# Patient Record
Sex: MALE | Employment: UNEMPLOYED | URBAN - METROPOLITAN AREA
[De-identification: names, ages, dates, MRNs, and addresses within clinical notes are randomized per-mention and may not be internally consistent; named-entity substitution may affect disease eponyms.]

---

## 2020-08-09 ENCOUNTER — HOSPITAL ENCOUNTER (EMERGENCY)
Age: 40
Discharge: HOME | End: 2020-08-10
Payer: COMMERCIAL

## 2020-08-09 ENCOUNTER — APPOINTMENT (OUTPATIENT)
Dept: CT IMAGING | Age: 40
End: 2020-08-09
Payer: COMMERCIAL

## 2020-08-09 ENCOUNTER — APPOINTMENT (OUTPATIENT)
Dept: GENERAL RADIOLOGY | Age: 40
End: 2020-08-09
Payer: COMMERCIAL

## 2020-08-09 VITALS
TEMPERATURE: 97.5 F | SYSTOLIC BLOOD PRESSURE: 122 MMHG | HEART RATE: 97 BPM | RESPIRATION RATE: 15 BRPM | DIASTOLIC BLOOD PRESSURE: 77 MMHG | OXYGEN SATURATION: 96 %

## 2020-08-09 LAB
A/G RATIO: 1.2 (ref 1.1–2.2)
ALBUMIN SERPL-MCNC: 4.3 G/DL (ref 3.4–5)
ALP BLD-CCNC: 81 U/L (ref 40–129)
ALT SERPL-CCNC: 33 U/L (ref 10–40)
ANION GAP SERPL CALCULATED.3IONS-SCNC: 12 MMOL/L (ref 3–16)
AST SERPL-CCNC: 45 U/L (ref 15–37)
BASOPHILS ABSOLUTE: 0 K/UL (ref 0–0.2)
BASOPHILS RELATIVE PERCENT: 0.5 %
BILIRUB SERPL-MCNC: 0.6 MG/DL (ref 0–1)
BILIRUBIN URINE: NEGATIVE
BLOOD, URINE: NEGATIVE
BUN BLDV-MCNC: 14 MG/DL (ref 7–20)
CALCIUM SERPL-MCNC: 8.7 MG/DL (ref 8.3–10.6)
CHLORIDE BLD-SCNC: 101 MMOL/L (ref 99–110)
CLARITY: CLEAR
CO2: 22 MMOL/L (ref 21–32)
COLOR: YELLOW
CREAT SERPL-MCNC: 1 MG/DL (ref 0.9–1.3)
D DIMER: <200 NG/ML DDU (ref 0–229)
EOSINOPHILS ABSOLUTE: 0 K/UL (ref 0–0.6)
EOSINOPHILS RELATIVE PERCENT: 0.1 %
GFR AFRICAN AMERICAN: >60
GFR NON-AFRICAN AMERICAN: >60
GLOBULIN: 3.5 G/DL
GLUCOSE BLD-MCNC: 101 MG/DL (ref 70–99)
GLUCOSE URINE: NEGATIVE MG/DL
HCT VFR BLD CALC: 47.1 % (ref 40.5–52.5)
HEMOGLOBIN: 15.9 G/DL (ref 13.5–17.5)
INR BLD: 1.14 (ref 0.86–1.14)
KETONES, URINE: 40 MG/DL
LEUKOCYTE ESTERASE, URINE: NEGATIVE
LYMPHOCYTES ABSOLUTE: 1.5 K/UL (ref 1–5.1)
LYMPHOCYTES RELATIVE PERCENT: 27.5 %
MCH RBC QN AUTO: 28.2 PG (ref 26–34)
MCHC RBC AUTO-ENTMCNC: 33.9 G/DL (ref 31–36)
MCV RBC AUTO: 83.4 FL (ref 80–100)
MICROSCOPIC EXAMINATION: ABNORMAL
MONOCYTES ABSOLUTE: 0.5 K/UL (ref 0–1.3)
MONOCYTES RELATIVE PERCENT: 9.1 %
NEUTROPHILS ABSOLUTE: 3.4 K/UL (ref 1.7–7.7)
NEUTROPHILS RELATIVE PERCENT: 62.8 %
NITRITE, URINE: NEGATIVE
PDW BLD-RTO: 13.6 % (ref 12.4–15.4)
PH UA: 5 (ref 5–8)
PLATELET # BLD: 127 K/UL (ref 135–450)
PMV BLD AUTO: 9.6 FL (ref 5–10.5)
POTASSIUM REFLEX MAGNESIUM: 4.4 MMOL/L (ref 3.5–5.1)
PRO-BNP: 12 PG/ML (ref 0–124)
PROTEIN UA: NEGATIVE MG/DL
PROTHROMBIN TIME: 13.2 SEC (ref 10–13.2)
RBC # BLD: 5.65 M/UL (ref 4.2–5.9)
SODIUM BLD-SCNC: 135 MMOL/L (ref 136–145)
SPECIFIC GRAVITY UA: 1.02 (ref 1–1.03)
TOTAL PROTEIN: 7.8 G/DL (ref 6.4–8.2)
TROPONIN: <0.01 NG/ML
URINE REFLEX TO CULTURE: ABNORMAL
URINE TYPE: ABNORMAL
UROBILINOGEN, URINE: 0.2 E.U./DL
WBC # BLD: 5.5 K/UL (ref 4–11)

## 2020-08-09 PROCEDURE — 96374 THER/PROPH/DIAG INJ IV PUSH: CPT

## 2020-08-09 PROCEDURE — 85025 COMPLETE CBC W/AUTO DIFF WBC: CPT

## 2020-08-09 PROCEDURE — 2580000003 HC RX 258: Performed by: PHYSICIAN ASSISTANT

## 2020-08-09 PROCEDURE — 93005 ELECTROCARDIOGRAM TRACING: CPT | Performed by: EMERGENCY MEDICINE

## 2020-08-09 PROCEDURE — 70450 CT HEAD/BRAIN W/O DYE: CPT

## 2020-08-09 PROCEDURE — 84484 ASSAY OF TROPONIN QUANT: CPT

## 2020-08-09 PROCEDURE — 6360000002 HC RX W HCPCS: Performed by: PHYSICIAN ASSISTANT

## 2020-08-09 PROCEDURE — 83880 ASSAY OF NATRIURETIC PEPTIDE: CPT

## 2020-08-09 PROCEDURE — 85379 FIBRIN DEGRADATION QUANT: CPT

## 2020-08-09 PROCEDURE — 36415 COLL VENOUS BLD VENIPUNCTURE: CPT

## 2020-08-09 PROCEDURE — 81003 URINALYSIS AUTO W/O SCOPE: CPT

## 2020-08-09 PROCEDURE — 80053 COMPREHEN METABOLIC PANEL: CPT

## 2020-08-09 PROCEDURE — 85610 PROTHROMBIN TIME: CPT

## 2020-08-09 PROCEDURE — 71045 X-RAY EXAM CHEST 1 VIEW: CPT

## 2020-08-09 PROCEDURE — 99285 EMERGENCY DEPT VISIT HI MDM: CPT

## 2020-08-09 RX ORDER — KETOROLAC TROMETHAMINE 30 MG/ML
15 INJECTION, SOLUTION INTRAMUSCULAR; INTRAVENOUS ONCE
Status: COMPLETED | OUTPATIENT
Start: 2020-08-09 | End: 2020-08-09

## 2020-08-09 RX ORDER — METOCLOPRAMIDE HYDROCHLORIDE 5 MG/ML
10 INJECTION INTRAMUSCULAR; INTRAVENOUS ONCE
Status: DISCONTINUED | OUTPATIENT
Start: 2020-08-09 | End: 2020-08-09

## 2020-08-09 RX ORDER — DIPHENHYDRAMINE HYDROCHLORIDE 50 MG/ML
25 INJECTION INTRAMUSCULAR; INTRAVENOUS ONCE
Status: DISCONTINUED | OUTPATIENT
Start: 2020-08-09 | End: 2020-08-09

## 2020-08-09 RX ORDER — KETOROLAC TROMETHAMINE 30 MG/ML
15 INJECTION, SOLUTION INTRAMUSCULAR; INTRAVENOUS ONCE
Status: DISCONTINUED | OUTPATIENT
Start: 2020-08-09 | End: 2020-08-09

## 2020-08-09 RX ORDER — 0.9 % SODIUM CHLORIDE 0.9 %
1000 INTRAVENOUS SOLUTION INTRAVENOUS ONCE
Status: COMPLETED | OUTPATIENT
Start: 2020-08-09 | End: 2020-08-09

## 2020-08-09 RX ORDER — 0.9 % SODIUM CHLORIDE 0.9 %
1000 INTRAVENOUS SOLUTION INTRAVENOUS ONCE
Status: COMPLETED | OUTPATIENT
Start: 2020-08-09 | End: 2020-08-10

## 2020-08-09 RX ORDER — BUTALBITAL, ACETAMINOPHEN AND CAFFEINE 300; 40; 50 MG/1; MG/1; MG/1
1 CAPSULE ORAL EVERY 4 HOURS PRN
Qty: 21 CAPSULE | Refills: 0 | Status: SHIPPED | OUTPATIENT
Start: 2020-08-09 | End: 2020-08-14

## 2020-08-09 RX ADMIN — KETOROLAC TROMETHAMINE 15 MG: 30 INJECTION, SOLUTION INTRAMUSCULAR at 23:18

## 2020-08-09 RX ADMIN — SODIUM CHLORIDE 1000 ML: 9 INJECTION, SOLUTION INTRAVENOUS at 21:09

## 2020-08-09 RX ADMIN — SODIUM CHLORIDE 1000 ML: 9 INJECTION, SOLUTION INTRAVENOUS at 23:18

## 2020-08-09 ASSESSMENT — ENCOUNTER SYMPTOMS
EYE REDNESS: 0
NAUSEA: 0
COUGH: 1
BACK PAIN: 0
SHORTNESS OF BREATH: 1
ABDOMINAL PAIN: 0
CHEST TIGHTNESS: 0
VOMITING: 0
DIARRHEA: 0

## 2020-08-09 ASSESSMENT — HEART SCORE: ECG: 0

## 2020-08-09 ASSESSMENT — PAIN SCALES - GENERAL
PAINLEVEL_OUTOF10: 5
PAINLEVEL_OUTOF10: 5

## 2020-08-10 LAB
EKG ATRIAL RATE: 110 BPM
EKG DIAGNOSIS: NORMAL
EKG P AXIS: 40 DEGREES
EKG P-R INTERVAL: 132 MS
EKG Q-T INTERVAL: 310 MS
EKG QRS DURATION: 68 MS
EKG QTC CALCULATION (BAZETT): 419 MS
EKG R AXIS: 33 DEGREES
EKG T AXIS: 6 DEGREES
EKG VENTRICULAR RATE: 110 BPM

## 2020-08-10 PROCEDURE — 93010 ELECTROCARDIOGRAM REPORT: CPT | Performed by: INTERNAL MEDICINE

## 2020-08-10 PROCEDURE — U0003 INFECTIOUS AGENT DETECTION BY NUCLEIC ACID (DNA OR RNA); SEVERE ACUTE RESPIRATORY SYNDROME CORONAVIRUS 2 (SARS-COV-2) (CORONAVIRUS DISEASE [COVID-19]), AMPLIFIED PROBE TECHNIQUE, MAKING USE OF HIGH THROUGHPUT TECHNOLOGIES AS DESCRIBED BY CMS-2020-01-R: HCPCS

## 2020-08-10 NOTE — ED PROVIDER NOTES
[address] [facility]        Pt Name: Francisco Childs  MRN: [id number]  [place] 1980  Date of evaluation: 8/9/2020  Provider: Connie Onofer PA-C  PCP: No primary care provider on file. Evaluation by SUDHEER. My supervising physician was available for consultation. CHIEF COMPLAINT       Chief Complaint   Patient presents with    Chest Pain     chest pain and headache for three days. unsure if he has had fever. HISTORY OF PRESENT ILLNESS   (Location, Timing/Onset, Context/Setting, Quality, Duration, Modifying Factors, Severity, Associated Signs and Symptoms)  Note limiting factors. Francisco Childs is a 38 y.o. male presents the emergency department with difficulties as a pertains to possible febrile illness, chest pain, headache, malaise, fatigue, and body aches. Patient states that he has had a potential for COVID exposure and a niece who has COVID. He states he has had symptoms for approximately 3 days. He denies any underlying lung disease. He states he has been experiencing some pain and discomfort in his chest as well as generalized headache pain. He is taken medications in the home environment with only marginal levels of relief. He states the pain and discomfort is diffuse over the entire chest.  It is not substernal chest pain. It does not radiate. It is somewhat intermittent and is not following any kind of pattern. He states his cough is nonproductive. He goes on to report his headache pain is generalized over his entire head. He does not have a report that this is thunderclap in onset. Is not the worst headache of his life. It is responsive to medications in the home environment. He states he is not having any significant shortness of breath. He denies a history of DVT and or PE. Denies travel history. He denies abdominal or flank pain. No nausea vomiting or diarrhea.   Has no additional complaints voiced at the present time. Nursing Notes were all reviewed and agreed with or any disagreements were addressed in the HPI. REVIEW OF SYSTEMS    (2-9 systems for level 4, 10 or more for level 5)     Review of Systems   Constitutional: Positive for chills and fatigue. Negative for activity change and fever. Eyes: Negative for redness. Respiratory: Positive for cough and shortness of breath. Negative for chest tightness. Cardiovascular: Positive for chest pain. Gastrointestinal: Negative for abdominal pain, diarrhea, nausea and vomiting. Genitourinary: Negative for dysuria and flank pain. Musculoskeletal: Positive for myalgias. Negative for back pain. Skin: Negative for rash and wound. Neurological: Positive for headaches. Positives and Pertinent negatives as per HPI. Except as noted above in the ROS, all other systems were reviewed and negative. PAST MEDICAL HISTORY   History reviewed. No pertinent past medical history. SURGICAL HISTORY   History reviewed. No pertinent surgical history. CURRENTMEDICATIONS       Previous Medications    IBUPROFEN (ADVIL;MOTRIN) 800 MG TABLET    Take 1 tablet by mouth every 8 hours as needed for Pain or Fever         ALLERGIES     Patient has no known allergies. FAMILYHISTORY     History reviewed. No pertinent family history. SOCIAL HISTORY       Social History     Tobacco Use    Smoking status: Never Smoker    Smokeless tobacco: Never Used   Substance Use Topics    Alcohol use: No    Drug use: No       SCREENINGS      Heart Score for chest pain patients  History:  Moderately Suspicious  ECG: Normal  Patient Age: < 45 years  Risk Factors: No risk factors known  Troponin: < 1X normal limit  Heart Score Total: 1      PHYSICAL EXAM    (up to 7 for level 4, 8 or more for level 5)     ED Triage Vitals [08/09/20 2017]   BP Temp Temp Source Pulse Resp SpO2 Height Weight   132/82 97.5 °F (36.4 °C) Oral 116 15 95 % -- --       Physical Exam  Vitals signs and nursing note reviewed. Constitutional:       General: He is awake. He is not in acute distress. Appearance: Normal appearance. He is well-developed. He is not ill-appearing or diaphoretic. HENT:      Head: Normocephalic and atraumatic. No raccoon eyes, Stewart's sign or contusion. Right Ear: External ear normal.      Left Ear: External ear normal.   Eyes:      General: No scleral icterus. Right eye: No discharge. Left eye: No discharge. Conjunctiva/sclera: Conjunctivae normal.   Neck:      Musculoskeletal: Normal range of motion. Vascular: No JVD. Cardiovascular:      Rate and Rhythm: Normal rate and regular rhythm. Heart sounds: No murmur. No friction rub. No gallop. Comments: Bilateral calves supple. Negative Homans bilaterally. Pulmonary:      Effort: Pulmonary effort is normal. No accessory muscle usage or respiratory distress. Breath sounds: Normal breath sounds. No wheezing, rhonchi or rales. Chest:      Comments: No areas of tenderness to palpation over the chest wall. Abdominal:      General: There is no distension. Palpations: Abdomen is soft. Abdomen is not rigid. There is no mass. Tenderness: There is no abdominal tenderness. There is no guarding or rebound. Musculoskeletal:      Right lower leg: No edema. Left lower leg: No edema. Skin:     General: Skin is warm and dry. Neurological:      General: No focal deficit present. Mental Status: He is alert and oriented to person, place, and time. GCS: GCS eye subscore is 4. GCS verbal subscore is 5. GCS motor subscore is 6. Cranial Nerves: No cranial nerve deficit. Sensory: No sensory deficit. Coordination: Coordination normal.   Psychiatric:         Behavior: Behavior normal. Behavior is cooperative.          DIAGNOSTIC RESULTS   LABS:    Labs Reviewed   CBC WITH AUTO DIFFERENTIAL - Abnormal; Notable for the following components:       Result Value    Platelets 545 (*)     All other components within normal limits    Narrative:     Performed at:  [facility] [address], [facility], [address] [employer]   Phone [phone]   COMPREHENSIVE METABOLIC PANEL W/ REFLEX TO MG FOR LOW K - Abnormal; Notable for the following components:    Sodium 135 (*)     Glucose 101 (*)     AST 45 (*)     All other components within normal limits    Narrative:     Performed at:  [facility] [address], [facility], [facility] [employer]   Phone [phone]   URINE RT REFLEX TO CULTURE - Abnormal; Notable for the following components:    Ketones, Urine 40 (*)     All other components within normal limits    Narrative:     Performed at:  [facility] [address], [facility], [facility] [employer]   Phone [phone]   TROPONIN    Narrative:     Performed at:  [facility] [address], [facility], [facility] [employer]   Phone [phone]   PROTIME-INR    Narrative:     Performed at:  [facility] [address], [facility], [address] [employer]   Phone [phone] PEPTIDE    Narrative:     Performed at:  [facility] [address], [facility], [facility] [employer]   Phone [phone]   D-DIMER, QUANTITATIVE    Narrative:     Performed at:  [facility] [address], [facility], [facility] [employer]   Phone [phone]       All other labs were within normal range or not returned as of this dictation. EKG: All EKG's are interpreted by the Emergency Department Physician in the absence of a cardiologist.  Please see their note for interpretation of EKG.       RADIOLOGY:   Non-plain film images such as CT, Ultrasound and MRI are read by the radiologist. Plain radiographic images are visualized and preliminarily interpreted by the ED Provider with the below findings:        Interpretation per the Radiologist below, if available at the time of this note:    XR CHEST PORTABLE   Final Result   1. No radiographic finding to account for patient's chest pain. CT HEAD WO CONTRAST   Final Result   No acute intracranial abnormality. No results found. PROCEDURES   Unless otherwise noted below, none     Procedures    CRITICAL CARE TIME   N/A    CONSULTS:  None      EMERGENCY DEPARTMENT COURSE and DIFFERENTIAL DIAGNOSIS/MDM:   Vitals:    Vitals:    08/09/20 2017 08/09/20 2115 08/09/20 2258 08/09/20 2328   BP: 132/82  122/77    Pulse: 116 103 102 97   Resp: 15      Temp: 97.5 °F (36.4 °C)      TempSrc: Oral      SpO2: 95% 97% 94% 96%       Patient was given the following medications:  Medications   0.9 % sodium chloride bolus (1,000 mLs Intravenous New Bag 8/9/20 2318)   0.9 % sodium chloride bolus (0 mLs Intravenous Stopped 8/9/20 2221)   ketorolac (TORADOL) injection 15 mg (15 mg Intravenous Given 8/9/20 2318)           The patient's detailed history of present illness is documented as above. Upon arrival to the emergency department the patient's vital signs are as documented. The patient is noted to be hemodynamically stable and afebrile. Physical examination findings are as above. IV access was obtained. Laboratory testing and work-up was initiated. Initial EKG performed upon arrival demonstrates a sinus tachycardia with a rate of 110. Normal axis and interval.  There is an incidental Q3T3 pattern that is noted he also has non-qualifying Q waves in aVF. No old EKG available for comparison. Please see attending physician details for further EKG interpretation. The patient's CBC is as documented above demonstrating no evidence of significant leukocytosis, anemia, mild thrombocytopenia at 127. BUN is 14 creatinine is 1.0 nonfasting glucose 101.   Sodium 135 remainder of his electrolytes are normal.  Slight hemolysis with an AST of 45 without evidence of elevation his alkaline phosphatase and no evidence of bilirubinemia. Patient's troponin is less than 0.01. INR is 1.14 proBNP is 12 and d-dimer is negative predictive value of less than 200. Chest x-ray demonstrates no evidence of acute cardiopulmonary process. CT of the head demonstrates no evidence of acute intercranial abnormality. Initial orders for headache cocktail had to be canceled as the patient did drive himself here to the emergency department today. He did have improvement with his symptoms with Toradol as above. Patient's tachycardia resolved with the IV fluids here in the emergency department setting. I do have concerns that the patient symptomatology could be consistent with COVID infection. He was tested for COVID here in the emergency department. Ongoing care management on an outpatient basis with isolation protocol until the test results are made available. Strict potential instructions for return more importantly were discussed. Analysis demonstrates evidence of dehydration but there is no evidence of infection. This as well as the patient's reassuring heart score is such that I believe that he can be maintained on an outpatient basis and does not require delta troponin since he has been having symptoms for more than 3 days. The patient has been made aware of the signs and symptoms which would necessitate an immediate return to the emergency department and verbalizes an understanding of these signs and symptoms. The patients clinical picture is most consistent with a non-cardiac etiology. Multiple potential etiologies were considered. No clinical evidence at this time to suggest acute coronary syndrome, pulmonary embolism, aortic dissection, aortic aneurysm, myocarditis, pneumonia, pneumothorax or pericardial tamponade or pericarditis. I feel the patient can be safely discharged to home with outpatient follow up.   I discussed this plan with the

## 2020-08-10 NOTE — ED PROVIDER NOTES
EKG Interpretation    Interpreted by emergency department physician    Rhythm: normal sinus   Rate: tachycardia  Axis: normal  Ectopy: none  Conduction: normal  ST Segments: no acute change  T Waves: no acute change  Q Waves: none    Clinical Impression: no acute changes    Winston Moise MD  08/09/20 6902

## 2020-08-11 LAB — SARS-COV-2, NAA: DETECTED

## 2020-08-25 ENCOUNTER — TELEPHONE (OUTPATIENT)
Dept: FAMILY MEDICINE CLINIC | Age: 40
End: 2020-08-25

## 2020-08-25 NOTE — TELEPHONE ENCOUNTER
The following patient needs an  scheduled or cancelled for an upcoming appointment.     Language:  Latvian     Appointment Date: 8/28/2020      Appointment Time: 11:00 am      Provider Scheduled with:  Sveta Figueroa    Thank you

## 2020-08-28 ENCOUNTER — VIRTUAL VISIT (OUTPATIENT)
Dept: FAMILY MEDICINE CLINIC | Age: 40
End: 2020-08-28
Payer: COMMERCIAL

## 2020-08-28 PROCEDURE — 99443 PR PHYS/QHP TELEPHONE EVALUATION 21-30 MIN: CPT | Performed by: CLINICAL NURSE SPECIALIST

## 2020-08-28 ASSESSMENT — PATIENT HEALTH QUESTIONNAIRE - PHQ9
1. LITTLE INTEREST OR PLEASURE IN DOING THINGS: 0
SUM OF ALL RESPONSES TO PHQ QUESTIONS 1-9: 0
SUM OF ALL RESPONSES TO PHQ9 QUESTIONS 1 & 2: 0
2. FEELING DOWN, DEPRESSED OR HOPELESS: 0
SUM OF ALL RESPONSES TO PHQ QUESTIONS 1-9: 0

## 2020-08-28 ASSESSMENT — ENCOUNTER SYMPTOMS
CONSTIPATION: 0
NAUSEA: 0
DIARRHEA: 0
WHEEZING: 0
ABDOMINAL PAIN: 0
VOMITING: 0
CHEST TIGHTNESS: 0

## 2020-08-28 NOTE — PROGRESS NOTES
Talking in complete sentences  Neurological:      Mental Status: He is alert and oriented to person, place, and time. Psychiatric:         Mood and Affect: Mood normal.         Thought Content: Thought content normal.       There were no vitals taken for this visit. BP Readings from Last 3 Encounters:   08/09/20 122/77   08/13/18 (!) 154/97      Wt Readings from Last 3 Encounters:   No data found for Wt       ASSESSMENT & PLAN:    1. COVID-19  - COVID-19 Ambulatory; Future    2. Encounter to establish care  - Patient's allergies, medication, medical, surgical, family and social history were reviewed and updated accordingly. - Encouraged to schedule complete physical with fasting labs      Continue current treatment plan. Current Outpatient Medications   Medication Sig Dispense Refill    butalbital-APAP-caffeine (FIORICET) -40 MG CAPS per capsule Take 1 capsule by mouth every 4 hours as needed for Headaches or Migraine 21 capsule 0    aspirin 325 MG EC tablet Take 1 tablet by mouth daily (Patient not taking: Reported on 8/28/2020) 30 tablet 0    ibuprofen (ADVIL;MOTRIN) 800 MG tablet Take 1 tablet by mouth every 8 hours as needed for Pain or Fever (Patient not taking: Reported on 8/28/2020) 30 tablet 0     No current facility-administered medications for this visit. Return if symptoms worsen or fail to improve, for positive covid test, establish care. Shaista received counseling on the following healthy behaviors: nutrition, exercise and medication adherence    Patient given educational materials on Covid 19    Discussed use, benefit, and side effects of prescribed medications. Barriers to medication compliance addressed. All patient questions answered. Pt voiced understanding. Call office if experience side effects from medications. Please note that some or all of this record was generated using voice recognition software.  If there are any questions about the content of this document, please contact the author as some errors in transcription may have occurred. Chantale Hurd is a 38 y.o. male being evaluated by a Virtual Visit (video visit) encounter to address concerns as mentioned above. A caregiver was present when appropriate. Due to this being a TeleHealth encounter (During VFORV-58 public health emergency), evaluation of the following organ systems was limited: Vitals/Constitutional/EENT/Resp/CV/GI//MS/Neuro/Skin/Heme-Lymph-Imm. Pursuant to the emergency declaration under the [address] [place] [address], [address] [place] [address] authority and the [employer] Act, this Virtual Visit was conducted with patient's (and/or legal guardian's) consent, to reduce the patient's risk of exposure to COVID-19 and provide necessary medical care. The patient (and/or legal guardian) has also been advised to contact this office for worsening conditions or problems, and seek emergency medical treatment and/or call 911 if deemed necessary. Patient identification was verified at the start of the visit: Yes    Total time spent for this encounter: 34 minutes 12 seconds    Services were provided through a video synchronous discussion virtually to substitute for in-person clinic visit. Patient and provider were located at their individual homes. --NATALY Abarca CNP on 8/28/2020 at 12:01 PM    An electronic signature was used to authenticate this note.

## 2020-08-28 NOTE — PATIENT INSTRUCTIONS
Discussed Covid 19, information given    Schedule for clinic visit for retest for Covid 19    Needs negative test to return to work. Encourage to schedule in person visit complete physical, with lab  Patient Education        ??????????? (COVID-19) ???? ?????? Learning About Coronavirus ([phone])  ??????????? ([phone]): ????????  ??????????? (COVID-19) ????? ?? ???  ??????????? ??? (COVID-19) ??????? ???? ?????? ?? ????? ??? ?? ??? 2019 ?? ?????????? ????? ??? ????? ??????? ??????? ???? ??? ?? ??????? ???????  ??????? ?????, ???? ? ??? ????? ?????? ?????? ????? ?????? ??????????? ???? ???????? ?????????? ???? ? ????? ???? ??? ??? ?????? ???????? ???? ?????? ?????????? ?????  ??????????? ?????????? ???? ???? ??? ????????? ??????? ???? ??????? ???????? [place] respiratory syndrome (MERS) ?  severe acute respiratory syndrome (SARS) ????? ???? ?????? ????????? ??? ?????????????? COVID-19 ???? ????????????? ?????? ???? ??? ?? ???? ?????? ?? ??? ????????? ????? ??????? ??????? ????  ?? ????? ???? ? ??????????? ???????? ?? ????????? ??????? ????? ?? ???????? ???? ????? ?????? ????? ??? ????? ????? ??????? ????? ?? ?????? ????? ????? ???? ???? ?????? ????????? ??? ?? ????? ?????  ????? ?????????????? ????? ?? ???? ?????????? (COVID-19)?  ?????? ?????? ?????? ?????? ????? ????? ?? ??:  · ?????? ???? ????????? ??? ????????????  · ???????? ????????????? ??????? ???????????  · ????? ?? ??????? ???? ??? ?????????????????? ?????: ????? ?????? ?????????  · ?????? ?????????? ? ??????? ??? ??????????? 6 ??? ???? ???? ?????? ??????????  · ???? ???? ????? ?????? ????????, ????? ??? ??????? ???? ?? ????????? ??? ???? ????? ? ???? ?????? ????????? ? ??????? ?? ????????? ???? ?????? ?????????? ????? ? ???? ?????? ??? ??? ??????? ???? ??? ????????? ?????? ??????????  · ????? ???, ??? ? ???? ??????????  ?????? ????? ????????? ???? ????? ?? ???? ???????????  ?????? ????? ????????? ???? ????? ????? ????? ???? ??????????:  · ???? ?? ????????? ?????? ?????? ?????? ??????????? ??????? ??????? ???????????? ???????????  · ??????? ?????? ???? ?????? ??? ???? ?????????? ?????? ??????????  · ????? ????????? ???????????? ??????? ??? ?????? ?????? ??? ???????????  · ???? ?????? ????????? ?? ??????? ???????? ????? ? ??? ???? ?????????? ?????, ??? ?? ????????? ????????? ?????????? ? ????????? ????????? ?????? ???????????  · ????? ?????? ????????? ???:  ? ???????? ?????? ???????? ???????? ? ??? ????? ????? ?? ??????????? ?? ????? ???????? ?????????? ?? ????????? ???? ???????? ???? ???????? ?? ????? ????? ?????????? ? ????? ????????????  ? ????? ???? ???????????? ?????? ????????? ??? ????? ??????????? ??????? ?????? ?? ????????? ????? ???? ???? ???????? ??????? ?????? ????? ????? ????? ???  ? ???????? ??? ??????? ?? ??? ? ?????????? ?????????? ?? ??? ????? ? ?????????? ???? ?? ?????? ?????? ?????????? ??????? ????? ????? ??????? ???? ?????? ??? ???? ????? ????? ??????????? ???? ??????? ????? ? ???????????? ?????????, ????? ????????, ??? ? ??????????? ?????? ???? ? ?????????, ??????, ?????? ? ????????? ?????????? ?????????????  ??????? ??????? ???? ????? ??????? ?????  ?? ??????????911 ???? ??? ????? ???????? ???????? ?????? ?????? ?????? ???????? ???? ????? ???? ?? ??????????:  · ???????? ??? ????? ?????? ?? (????? ???? ????? ???????????)  · ??????? ?????? ?????? ????? ?? ???? ??????  · ???????? ?????? ????? ?? ????? ????? ?????? ??  · ????? ???????? ????????? ?? ????????? ????? ???????????  · ??????? ?????? ? ?? ???? ????? ??  · ????? ????? (????? ???????) ?? ???? ???? ?????? ??  ????? ???????? ????? ???????????? ????? ????? ???????? ????? ?????????? ???:  · ??? ????? ???????  · ??????  · ?????  ???????? ???????? ????? ? ??? ???????? ?????? ?????????? ???? ??????? ????? ????? ????????? ??????? ???? ?????????? ????? ???????? ???? ????? ?????? ?????? ??????? ??????????  ??????? ?????? ??????? ???? ??????? ???? ???????????  ????? ????????? ?????????? ?? ????? ??????? ? ?????? ???????? ???? ????????? ??????? ?? ?? ????? ???? ??????? ?? ????? ??????? ????????? ???? ????? ????? ??? ??????? ?? ????? ????? ?? ???? ???? ????? ???  · [place] Centers for Disease Control and Prevention (CDC): CDC ?? ????? ????????? ?????? ?????? ? ?????? ?????? ?????? ????????? ???????? ????????? ?????? ???? ????? ????? ???? ??? ??????? [email or web address]  · ????? ????????? ?????? (WHO): WHO ?? ??????? ???????? ?????? ??????? ?????? ?????? WHO ?? ??? ?????? ?????? ?? [email or web address]  ?? ????? ???: 2020 05 08               ????????? ?????: 12.5  © [phone] Healthwise, Incorporated. ???????? ?????????????? ?????? ???????? ???????? ??????? ????????? ?????? ????? ??????? ??? ???????? ?? ???????? ?????? ?? ?? ?????????? ?????? ????????? ??? ???, ???? ????? ????????? ?????? ????? ????????? ??????????? Healthwise, Incorporated, ?? ??????? ?? ????????? ???????? ???? ???? ??? ???????? ?? ?????????? ???????? ?????? Patient Education        COVID-19 ? ??????? ???? ???? ??????   Learning About COVID-19 and Social Distancing  ?? ????? ?? ???  ??????? ???? ????? ??? ? ???? ??????? ??? ???? ?????? ??? ??????? ?????? ???? 6 ??? ?? ???? 2 ???? ??? ???? ???? ???????? ???? ???????? ???? ?????????? ???? ??? ??????, ????? ???????? ?? ???? ????????? ???? ???? ????????  ???????????? ?? ???  COVID-19 ?? ?????????? ?? ???? ??????? ???? ????? ????? ????? ??? ?? ????? ???? ? ??????????? ???????? ?? ????????? ??????? ???? ????? ???????? ??????? ????? ?????? ???? ???????? ??? ????? ????? ???????? ???? ?? ????? ??????? ???????? ?? ????? ??????  ? ??????? ???? ????? ???? ?????????? ?, ???? ?? ?????????? ???? ?? ????????? ???? ??????? ???, ????? ?????????? ?????? ???????? ????? ??? ???? ?? ???????? ?????? ????? ?? ??????? ????????? ???? ?????? ??? ??????? ??? ???????????  ?? ???? ??????  ????? ? ???? ?????????? ??? 6 ??? ?? ???? 2 ???? ????? ??????? ?????? ??? ???? ??? ???????? ???? ???????? ???? ?????????? ???? ??? ??????, ????? ???????? ?? ???? ????????? ???? ???? ???????? ????? ? ???:  · ????? ??? ????????? ? ??????? ??????????? ???? ???????????  · ??????? ????? ?????? ??? ?????? ?????????? ? ????? ?????????? ??? ????????? ?????????? ??????????  · ?????? ???????? ?????? ????? ????????? ????? ???? ? ????????  · ????? ?????? ??? ?? ???????? ????? ????????? ?????? ??????? ??? ????? ???????????  · ???????????????? ?????????? (????? ??? ??? ?????? ?? ???? ???? ??? ???????????)  · ??? ? ?????? ??????? ?????????? ??????? ????????? ???? ???? ????? ????? ?? ???? ???? ??????????? ????? ??????????  ?? ????? ???: 2020 05 08               ????????? ?????: 12.5  © [phone] Healthwise, Incorporated. ???????? ?????????????? ?????? ???????? ???????? ??????? ????????? ?????? ????? ??????? ??? ???????? ?? ???????? ?????? ?? ?? ?????????? ?????? ????????? ??? ???, ???? ????? ????????? ?????? ????? ????????? ??????????? Healthwise, Incorporated, ?? ??????? ?? ????????? ???????? ???? ???? ??? ???????? ?? ?????????? ???????? ?????? Patient Education        ??????????? ([phone]): ?????? ????? ?????   Coronavirus (COVID-19): Care Instructions  ????????  ??????????? ??? (COVID-19) ??????? ???? ?????? ???? ?????, ???? ? ????? ????? ?????? ?????? ?? ???? ? ??????????? ???????? ?? ????????? ??????? ????? ????? ???????? ????????? ?????? ????????? ????? ??? ????? ?????  ???? ??? ????????? ????? ???????? ??????? ? ?????? ????? ???? ??????? ??? ??????? ????????? ???????? ???? ????? ?? ??? ?????????? ????????? ???????? ?????? ???? ????? ???????? ???? ???? ???? ????  ?? ????? ????? ?????? ???? ?????????? ?? ???????? COVID-19 ???? ? ??? ????? ??????? ???? ???? ???????????? ?????? ????? ??????????? ????? ?????? ????? ??????? ?????? ??? ?????? ????? ??????? ???????? ???????? ?????? ?? ???? ????? ?????? ???? ???????? ???????? ?????? ???????? ???????? ? ??? ????? ????? ?? ???????????  ???-?? ?????? ??????? ????? ??? ????????? ????? ??? ??? ??? ????????????? ??? ??? ????? ??????? ????????? ???? ??? ???????? ????????? ??????? ? ??? ????? ????????? ?? ?????????? ????? ?????? ???????? ???? ????? ??? ??????? ???? ????????? ???? ?????? ??? ?? ?????? ????? ???  ????? ???? ???? ????? ?????? ???? ???????????  · ???????? ???? ?????????? ???? ???????? ?????? ????? ???? ????? ???????????  · ???? ??? ?????? ?????????? ???? ???????? ?????? ???????????? ??????????? ???? ????? ?????? ??? ???????? ????? ????? ????? ????? ??? ????? ??????????? ????, ???, ??ko ?? ? ???????? ???? ???? ?????? ?????? ???  · ???????? ?? ???? ???????????? (?????  Tylenol) ????????? ???? ???????? ??????? ??? ????? ????????? ????? ?????? ??? ??????????? ????????? ? ????? ??????????  · ???????? ????? ???? ??????? ??????? ?????? ?????? ?????? ?????????? ???? ???? ?? ??? ?????? ???????????  · ?????? ?????? ?????????? ???? ?????? ?????????? ??????? ??? ? ?? ?????? ???? ??????????? ??? ???? ????????? ????? ???? ???? ?????? ???? ?????  ?????????? ???? ??????  · ????? ???? ???????????? ?????? ????????? ??? ????? ??????????? ??????? ?????? ?? ????????? ????? ???? ???? ???????? ??????? ?????? ????? ????? ????? ???  · ??????? ???? ?????????? ??????? ????? ?????????? ????? ? ??? ?????? ???????? ????????? ? ?????? ?????? ?????? ??????????  · ???????? ????? ? ???? ??????? ?????????? ???? ?????????  · ???? ?? ????????? ?????? ?????? ?????? ??????????? ???????, ????? ???????? ???????????? ???????????  · ???? ???? ????? ?????? ????????, ????? ??? ??????? ???? ?? ????????? ??? ???? ????? ? ???? ?????? ????????? ? ??????? ?? ????????? ???? ?????? ?????????? ????? ? ???? ?????? ??? ??? ??????? ???? ??? ????????? ?????? ??????????  · ????????? ????? ????? ???? ??????????? ???? ??????, ?????, ?? ? ????? ? ???? ????? ?????? ????  · ???????? ??? ??????? ?? ??? ? ?????????? ?????????? ?? ??? ????? ? ?????????? ???? ?? ?????? ?????? ?????????? ??????? ????? ????? ??????? ???? ?????? ??? ???? ????? ????? ??????????? ???? ??????? ????? ? ???????????? ?????????, ????? ????????, ??? ? ??????????? ?????? ???? ? ?????????, ??????, ?????? ? ????????? ?????????? ?????????????  ??????? ????? ??????? ???? ??????? ????? ?????  ?? ??????????911 ???? ??? ????? ???????? ???????? ?????? ?????? ?????? ???????? ????, ???????? ??????? ??????? ?????? ???????? ??? ??? ?? ?????????, ?????:  · ???????? ??? ????? ?????? ?? (????? ???? ????? ???????????)  · ??????? ?????? ?????? ????? ?? ???? ??????  · ???????? ?????? ????? ?? ????? ????? ?????? ??  · ????? ???????? ????????? ?? ????????? ????? ???????????  · ??????? ?????? ? ?? ???? ????? ??  · ????? ????? (????? ???????) ?? ???? ???? ?????? ??  ????? ???????? ????? ???????????? ?? ????? ???? ?????? ???????? ???? ??????????:  · ???????? ??? ????? ?????? ?? (????? ????? ????? ????? ???????????)  · ????? ??? ?????? ????????? (???? 1 ????? ????? ???)?  · ???????? ?? ????????? ????? ?? ???? ????? ????? ?????? ????? ?????; ???? ???? ????? ; ? ????, ??????, ????? ?????  ??????? ??????????? ?????????? ???? ??????? ?????????? ? ????? ???? ??????? ????????? ??????? ???? ??????? ?????????:  · ??????? ???????? ???? ????? ??????  · ??????? ??????? ??? ????? ?????? ????? ??????????  ?????????? ???? ????? ????? ?? ??????????  ????????? ????????????? ????? ?????????? ? ????? ????????????  ?? ????? ???: 2020 05 08               ????????? ?????: 12.5  © 6480-0281 [employer]wise, Incorporated. ???????? ?????????????? ?????? ???????? ???????? ??????? ????????? ?????? ????? ??????? ??? ???????? ?? ???????? ?????? ?? ?? ?????????? ?????? ????????? ??? ???, ???? ????? ????????? ?????? ????? ????????? ???????????  Healthwise, Incorporated, ?? ??????? ?? ????????? ???????? ???? ???? ??? ???????? ?? ?????????? ???????? ??????

## 2020-09-03 ENCOUNTER — OFFICE VISIT (OUTPATIENT)
Dept: PRIMARY CARE CLINIC | Age: 40
End: 2020-09-03
Payer: COMMERCIAL

## 2020-09-03 PROCEDURE — 99211 OFF/OP EST MAY X REQ PHY/QHP: CPT | Performed by: NURSE PRACTITIONER

## 2020-09-03 PROCEDURE — G8428 CUR MEDS NOT DOCUMENT: HCPCS | Performed by: NURSE PRACTITIONER

## 2020-09-03 PROCEDURE — G8421 BMI NOT CALCULATED: HCPCS | Performed by: NURSE PRACTITIONER

## 2020-09-03 NOTE — PATIENT INSTRUCTIONS

## 2020-09-03 NOTE — PROGRESS NOTES
Lou Jolly received a viral test for COVID-19. They were educated on isolation and quarantine as appropriate. For any symptoms, they were directed to seek care from their PCP, given contact information to establish with a doctor, directed to an urgent care or the emergency room. 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 Per prior records and family/HHA at bedside, patient had an aspiration event ("choking while eating") on Monday 3/2/20 (5 days prior to admission) and subsequently developed a cough productive of yellow sputum. Patient admitted for aspiration PNA, Also found to have heart failure exacerbation.    #Acute Heart Failure - no prior diagnosis of heart failure seen in chart.   - c/w Furosemide 40mg IV BID     -> goal I/O > net -1L/day  - close Intake/Output monitoring  - 2L/day fluid restriction  - daily standing weights  - daily BMP (renal function monitoring and electrolyte repletion PRN)  - formal echocardiogram    #Atrial tachycardia - patient currently rate controlled.  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    #Acute Heart Failure - no prior diagnosis of heart failure seen in chart.   - c/w Furosemide 40mg IV BID     -> goal I/O > net -1L/day  - close Intake/Output monitoring  - 2L/day fluid restriction  - daily standing weights  - daily BMP (renal function monitoring and electrolyte repletion PRN)  - formal echocardiogram    #Atrial tachycardia - patient currently rate controlled.  - monitor HR  - no indication for anticoagulation at this time    --  Damian Stover MD  Cardiology PGY4

## 2020-09-05 LAB — SARS-COV-2, NAA: DETECTED

## 2021-03-09 ENCOUNTER — APPOINTMENT (OUTPATIENT)
Dept: GENERAL RADIOLOGY | Age: 41
End: 2021-03-09
Payer: COMMERCIAL

## 2021-03-09 ENCOUNTER — HOSPITAL ENCOUNTER (EMERGENCY)
Age: 41
Discharge: HOME | End: 2021-03-09
Payer: COMMERCIAL

## 2021-03-09 VITALS
WEIGHT: 165 LBS | BODY MASS INDEX: 27.49 KG/M2 | DIASTOLIC BLOOD PRESSURE: 82 MMHG | TEMPERATURE: 101.2 F | HEART RATE: 106 BPM | OXYGEN SATURATION: 97 % | SYSTOLIC BLOOD PRESSURE: 142 MMHG | HEIGHT: 65 IN | RESPIRATION RATE: 18 BRPM

## 2021-03-09 DIAGNOSIS — Z20.822 SUSPECTED COVID-19 VIRUS INFECTION: Primary | ICD-10-CM

## 2021-03-09 LAB
RAPID INFLUENZA  B AGN: NEGATIVE
RAPID INFLUENZA A AGN: NEGATIVE
SARS-COV-2, NAAT: NOT DETECTED

## 2021-03-09 PROCEDURE — 87635 SARS-COV-2 COVID-19 AMP PRB: CPT

## 2021-03-09 PROCEDURE — 71046 X-RAY EXAM CHEST 2 VIEWS: CPT

## 2021-03-09 PROCEDURE — 99283 EMERGENCY DEPT VISIT LOW MDM: CPT

## 2021-03-09 PROCEDURE — 87804 INFLUENZA ASSAY W/OPTIC: CPT

## 2021-03-09 PROCEDURE — 6370000000 HC RX 637 (ALT 250 FOR IP): Performed by: PHYSICIAN ASSISTANT

## 2021-03-09 RX ORDER — ACETAMINOPHEN 500 MG
1000 TABLET ORAL ONCE
Status: COMPLETED | OUTPATIENT
Start: 2021-03-09 | End: 2021-03-09

## 2021-03-09 RX ORDER — IBUPROFEN 800 MG/1
800 TABLET ORAL ONCE
Status: COMPLETED | OUTPATIENT
Start: 2021-03-09 | End: 2021-03-09

## 2021-03-09 RX ORDER — ONDANSETRON 4 MG/1
8 TABLET, ORALLY DISINTEGRATING ORAL ONCE
Status: COMPLETED | OUTPATIENT
Start: 2021-03-09 | End: 2021-03-09

## 2021-03-09 RX ORDER — AZITHROMYCIN 250 MG/1
TABLET, FILM COATED ORAL
Qty: 1 PACKET | Refills: 0 | Status: SHIPPED | OUTPATIENT
Start: 2021-03-09 | End: 2021-03-19

## 2021-03-09 RX ADMIN — IBUPROFEN 800 MG: 800 TABLET, FILM COATED ORAL at 16:50

## 2021-03-09 RX ADMIN — ONDANSETRON 8 MG: 4 TABLET, ORALLY DISINTEGRATING ORAL at 16:49

## 2021-03-09 RX ADMIN — ACETAMINOPHEN 1000 MG: 500 TABLET ORAL at 16:49

## 2021-03-09 ASSESSMENT — ENCOUNTER SYMPTOMS
NAUSEA: 0
DIARRHEA: 0
SHORTNESS OF BREATH: 0
COUGH: 0
ABDOMINAL PAIN: 0
VOMITING: 0
WHEEZING: 0
RHINORRHEA: 0

## 2021-03-09 NOTE — ED PROVIDER NOTES
for neck pain and neck stiffness. Skin: Negative for rash. Neurological: Positive for headaches. Negative for dizziness, syncope, weakness and light-headedness. Positives and Pertinent negatives as per HPI. Except as noted above in the ROS, all other systems were reviewed and negative. PAST MEDICAL HISTORY     Past Medical History:   Diagnosis Date    COVID-19          SURGICAL HISTORY   History reviewed. No pertinent surgical history. CURRENTMEDICATIONS       Previous Medications    ASPIRIN 325 MG EC TABLET    Take 1 tablet by mouth daily    BUTALBITAL-APAP-CAFFEINE (FIORICET) -40 MG CAPS PER CAPSULE    Take 1 capsule by mouth every 4 hours as needed for Headaches or Migraine    IBUPROFEN (ADVIL;MOTRIN) 800 MG TABLET    Take 1 tablet by mouth every 8 hours as needed for Pain or Fever         ALLERGIES     Patient has no known allergies. FAMILYHISTORY       Family History   Problem Relation Age of Onset    Diabetes Mother     Depression Mother     High Blood Pressure Mother     High Blood Pressure Father     Blindness Father     Diabetes Father     High Cholesterol Father     Other Father         thyroid issues          SOCIAL HISTORY       Social History     Tobacco Use    Smoking status: Never Smoker    Smokeless tobacco: Never Used   Substance Use Topics    Alcohol use: No    Drug use: No       SCREENINGS             PHYSICAL EXAM    (up to 7 for level 4, 8 or more for level 5)     ED Triage Vitals [03/09/21 1615]   BP Temp Temp Source Pulse Resp SpO2 Height Weight   133/89 102 °F (38.9 °C) Oral 109 18 95 % 5' 5\" (1.651 m) 165 lb (74.8 kg)       Physical Exam  Vitals signs and nursing note reviewed. Constitutional:       General: He is not in acute distress. Appearance: He is well-developed. He is not ill-appearing, toxic-appearing or diaphoretic. HENT:      Head: Normocephalic and atraumatic.       Right Ear: External ear normal.      Left Ear: External ear normal.      Nose: Nose normal.      Mouth/Throat:      Mouth: Mucous membranes are moist.      Pharynx: Oropharynx is clear. No oropharyngeal exudate or posterior oropharyngeal erythema. Eyes:      General:         Right eye: No discharge. Left eye: No discharge. Extraocular Movements: Extraocular movements intact. Conjunctiva/sclera: Conjunctivae normal.      Pupils: Pupils are equal, round, and reactive to light. Neck:      Musculoskeletal: Normal range of motion and neck supple. No neck rigidity or muscular tenderness. Cardiovascular:      Rate and Rhythm: Regular rhythm. Tachycardia present. Heart sounds: Normal heart sounds. Pulmonary:      Effort: Pulmonary effort is normal. No respiratory distress. Breath sounds: Normal breath sounds. Chest:      Chest wall: No tenderness. Abdominal:      General: There is no distension. Palpations: Abdomen is soft. Tenderness: There is no abdominal tenderness. Musculoskeletal: Normal range of motion. Lymphadenopathy:      Cervical: No cervical adenopathy. Skin:     General: Skin is warm and dry. Neurological:      Mental Status: He is alert and oriented to person, place, and time. Psychiatric:         Behavior: Behavior normal.         DIAGNOSTIC RESULTS   LABS:    Labs Reviewed   COVID-19, RAPID    Narrative:     Performed at:  [facility] [address], [facility], [address]   Phone [phone]   RAPID INFLUENZA A/B ANTIGENS    Narrative:     Performed at:  [facility] [address], [facility], [address]   Phone [phone]       All other labs were within normal range or not returned as of this dictation. EKG: All EKG's are interpreted by the Emergency Department Physician in the absence of a cardiologist.  Please see their note for interpretation of EKG.       RADIOLOGY:   Non-plain film images such as CT, Ultrasound and MRI are read by the radiologist. Bonnie Jackson radiographic images are visualized and preliminarily interpreted by the ED Provider with the below findings:        Interpretation per the Radiologist below, if available at the time of this note:    XR CHEST (2 VW)   Final Result   Bibasilar ill-defined pulmonary opacities could represent subsegmental   atelectasis or multifocal pneumonia           Xr Chest (2 Vw)    Result Date: 3/9/2021  EXAMINATION: TWO XRAY VIEWS OF THE CHEST 3/9/2021 4:37 pm COMPARISON: 08/09/2020 HISTORY: ORDERING SYSTEM PROVIDED HISTORY: fever, cough TECHNOLOGIST PROVIDED HISTORY: Reason for exam:->fever, cough Reason for Exam: fever, headache- states symptoms began last night Acuity: Acute Type of Exam: Initial FINDINGS: Bibasilar ill-defined pulmonary opacities. There is no effusion or pneumothorax. The cardiomediastinal silhouette is stable. The osseous structures are stable. Bibasilar ill-defined pulmonary opacities could represent subsegmental atelectasis or multifocal pneumonia           PROCEDURES   Unless otherwise noted below, none     Procedures    CRITICAL CARE TIME   N/A    CONSULTS:  None      EMERGENCY DEPARTMENT COURSE and DIFFERENTIAL DIAGNOSIS/MDM:   Vitals:    Vitals:    03/09/21 1615 03/09/21 1708   BP: 133/89 (!) 142/82   Pulse: 109 106   Resp: 18 18   Temp: 102 °F (38.9 °C) 101.2 °F (38.4 °C)   TempSrc: Oral Oral   SpO2: 95% 97%   Weight: 165 lb (74.8 kg)    Height: 5' 5\" (1.651 m)        Patient was given the following medications:  Medications   ibuprofen (ADVIL;MOTRIN) tablet 800 mg (800 mg Oral Given 3/9/21 1650)   ondansetron (ZOFRAN-ODT) disintegrating tablet 8 mg (8 mg Oral Given 3/9/21 1649)   acetaminophen (TYLENOL) tablet 1,000 mg (1,000 mg Oral Given 3/9/21 1649)           Patient presents for evaluation of headache fever and body aches. On exam, he looks that he does not feel well but is in no acute distress nontoxic. Slightly tachycardic at 109 with a temp of 102.   Vitals [address]  [phone]  Go to   If symptoms worsen      DISCHARGE MEDICATIONS:  New Prescriptions    AZITHROMYCIN (ZITHROMAX) 250 MG TABLET    Take 2 tablets (500 mg) on Day 1, followed by 1 tablet (250 mg) once daily on Days 2 through 5.        DISCONTINUED MEDICATIONS:  Discontinued Medications    No medications on file              (Please note that portions of this note were completed with a voice recognition program.  Efforts were made to edit the dictations but occasionally words are mis-transcribed.)    Obdulio Driver PA-C (electronically signed)           Flora Hamman, PA-C  03/09/21 0083

## 2021-03-09 NOTE — LETTER
[facility] Emergency Department  [address] [facility], [address]             March 9, 2021    Patient: Roxy Varma   YOB: 1980   Date of Visit: 3/9/2021       To Whom It May Concern: Roxy Varma was seen and treated in our emergency department on 3/9/2021. He may return to work on 3/19/2021.       Sincerely,         [facility] ER

## 2021-03-09 NOTE — ED NOTES
I ambulated the patient with pulse ox, pt did well and was not short of breath. Highest pulse was 119bpm, lowest pulse was 106bpm. Highest O2 was 100%, and lowest O2 was 97%.       Cari Ashley  03/09/21 1701

## 2021-06-13 ENCOUNTER — APPOINTMENT (OUTPATIENT)
Dept: GENERAL RADIOLOGY | Age: 41
End: 2021-06-13
Payer: COMMERCIAL

## 2021-06-13 ENCOUNTER — HOSPITAL ENCOUNTER (EMERGENCY)
Age: 41
Discharge: HOME | End: 2021-06-13
Payer: COMMERCIAL

## 2021-06-13 VITALS
TEMPERATURE: 98.4 F | WEIGHT: 164 LBS | OXYGEN SATURATION: 100 % | RESPIRATION RATE: 16 BRPM | DIASTOLIC BLOOD PRESSURE: 90 MMHG | HEART RATE: 81 BPM | SYSTOLIC BLOOD PRESSURE: 145 MMHG | BODY MASS INDEX: 27.29 KG/M2

## 2021-06-13 DIAGNOSIS — M54.50 ACUTE BILATERAL LOW BACK PAIN WITHOUT SCIATICA: Primary | ICD-10-CM

## 2021-06-13 PROCEDURE — 6370000000 HC RX 637 (ALT 250 FOR IP): Performed by: PHYSICIAN ASSISTANT

## 2021-06-13 PROCEDURE — 72100 X-RAY EXAM L-S SPINE 2/3 VWS: CPT

## 2021-06-13 PROCEDURE — 99283 EMERGENCY DEPT VISIT LOW MDM: CPT

## 2021-06-13 RX ORDER — LIDOCAINE 4 G/G
1 PATCH TOPICAL ONCE
Status: DISCONTINUED | OUTPATIENT
Start: 2021-06-13 | End: 2021-06-13 | Stop reason: HOSPADM

## 2021-06-13 RX ORDER — NAPROXEN 500 MG/1
500 TABLET ORAL 2 TIMES DAILY
Qty: 20 TABLET | Refills: 0 | OUTPATIENT
Start: 2021-06-13 | End: 2022-03-08

## 2021-06-13 RX ORDER — IBUPROFEN 400 MG/1
400 TABLET ORAL ONCE
Status: COMPLETED | OUTPATIENT
Start: 2021-06-13 | End: 2021-06-13

## 2021-06-13 RX ORDER — CYCLOBENZAPRINE HCL 10 MG
10 TABLET ORAL ONCE
Status: COMPLETED | OUTPATIENT
Start: 2021-06-13 | End: 2021-06-13

## 2021-06-13 RX ORDER — LIDOCAINE 50 MG/G
1 PATCH TOPICAL DAILY
Qty: 30 PATCH | Refills: 0 | Status: SHIPPED | OUTPATIENT
Start: 2021-06-13 | End: 2022-03-08

## 2021-06-13 RX ORDER — CYCLOBENZAPRINE HCL 10 MG
10 TABLET ORAL 3 TIMES DAILY PRN
Qty: 20 TABLET | Refills: 0 | Status: SHIPPED | OUTPATIENT
Start: 2021-06-13 | End: 2022-03-08

## 2021-06-13 RX ADMIN — IBUPROFEN 400 MG: 400 TABLET, FILM COATED ORAL at 14:20

## 2021-06-13 RX ADMIN — CYCLOBENZAPRINE 10 MG: 10 TABLET, FILM COATED ORAL at 14:20

## 2021-06-13 ASSESSMENT — ENCOUNTER SYMPTOMS
BACK PAIN: 1
COLOR CHANGE: 0
NAUSEA: 0
RECTAL PAIN: 0
SHORTNESS OF BREATH: 0
COUGH: 0
DIARRHEA: 0
VOMITING: 0
ABDOMINAL PAIN: 0

## 2021-06-13 ASSESSMENT — PAIN SCALES - GENERAL
PAINLEVEL_OUTOF10: 9
PAINLEVEL_OUTOF10: 9

## 2021-06-13 NOTE — LETTER
[facility] Emergency Department  [address] [facility], [place], [address]             June 13, 2021    Patient: Hardeep Geiger   YOB: 1980   Date of Visit: 6/13/2021       To Whom It May Concern: Hardeep Geiger was seen and treated in our emergency department on 6/13/2021. He may return to work on 6/14/21.       Sincerely,         [facility] ER

## 2021-06-13 NOTE — ED PROVIDER NOTES
[facility]        Pt Name: Barbara Ward  MRN: [id number]  [place] 1980  Date of evaluation: 6/13/2021  Provider: Ricky Smith PA-C  PCP: No primary care provider on file. Note Started: 2:25 PM EDT       SUDHEER. I have evaluated this patient. My supervising physician was available for consultation. CHIEF COMPLAINT       Chief Complaint   Patient presents with    Back Pain     Pt states back pain that began last night, states he picked up a heavy object and now has pain        HISTORY OF PRESENT ILLNESS   (Location, Timing/Onset, Context/Setting, Quality, Duration, Modifying Factors, Severity, Associated Signs and Symptoms)  Note limiting factors. Barbara Ward is a 42 y.o. male who presents with a Chief Complaint of low back pain since last night after lifting something heavy at work. Denies any blunt trauma or fall. Walked into the emergency department without difficulty. Denies numbness, tingling, weakness, bowel or bladder incontinence or retention, recent instrumentation of spine, fever or chills. Rates his pain to be a 9 out of 10 on pain scale without radiation of symptoms. Nursing Notes were all reviewed and agreed with or any disagreements were addressed in the HPI. REVIEW OF SYSTEMS    (2-9 systems for level 4, 10 or more for level 5)     Review of Systems   Constitutional: Negative for chills and fever. HENT: Negative. Eyes: Negative for visual disturbance. Respiratory: Negative for cough and shortness of breath. Cardiovascular: Negative for chest pain. Gastrointestinal: Negative for abdominal pain, diarrhea, nausea, rectal pain and vomiting. Endocrine: Negative. Genitourinary: Negative. Musculoskeletal: Positive for back pain. Negative for neck pain and neck stiffness. Skin: Negative for color change, pallor, rash and wound. Neurological: Negative.     Psychiatric/Behavioral: Negative External ear normal.      Nose: Nose normal.      Mouth/Throat:      Mouth: Mucous membranes are moist.      Pharynx: Oropharynx is clear. Eyes:      General: No scleral icterus. Right eye: No discharge. Left eye: No discharge. Extraocular Movements: Extraocular movements intact. Conjunctiva/sclera: Conjunctivae normal.      Pupils: Pupils are equal, round, and reactive to light. Cardiovascular:      Rate and Rhythm: Normal rate. Pulses:           Femoral pulses are 2+ on the right side and 2+ on the left side. Dorsalis pedis pulses are 2+ on the right side and 2+ on the left side. Posterior tibial pulses are 2+ on the right side and 2+ on the left side. Pulmonary:      Effort: Pulmonary effort is normal.      Breath sounds: Normal breath sounds. Abdominal:      General: Bowel sounds are normal. There is no distension or abdominal bruit. Palpations: Abdomen is soft. There is no pulsatile mass. Tenderness: There is no abdominal tenderness. There is no right CVA tenderness, left CVA tenderness, guarding or rebound. Negative signs include England's sign, Rovsing's sign, McBurney's sign, psoas sign and obturator sign. Musculoskeletal:         General: Normal range of motion. Cervical back: Normal and normal range of motion. Thoracic back: Normal.      Lumbar back: Spasms and tenderness present. No swelling, edema, deformity, signs of trauma, lacerations or bony tenderness. Normal range of motion. Negative right straight leg raise test and negative left straight leg raise test. No scoliosis. Comments: No midline vertebral tenderness or step-off deformity. Negative straight leg raise. No saddle paresthesia or foot drop. Able to heel and toe walk without difficulty or deficit. 5/5 strength in all four extremities without focal weakness, paresthesia or radiculopathy   Skin:     General: Skin is warm and dry.       Capillary Refill: Capillary refill takes less than 2 seconds. Coloration: Skin is not jaundiced or pale. Findings: No bruising, erythema, lesion or rash. Neurological:      General: No focal deficit present. Mental Status: He is alert and oriented to person, place, and time. Sensory: No sensory deficit. Motor: No weakness. Deep Tendon Reflexes: Reflexes normal.   Psychiatric:         Mood and Affect: Mood normal.         Behavior: Behavior normal.         DIAGNOSTIC RESULTS   LABS:    Labs Reviewed - No data to display    All other labs were within normal range or not returned as of this dictation. EKG: All EKG's are interpreted by the Emergency Department Physician in the absence of a cardiologist.  Please see their note for interpretation of EKG. RADIOLOGY:   Non-plain film images such as CT, Ultrasound and MRI are read by the radiologist. Plain radiographic images are visualized and preliminarily interpreted by the ED Provider with the below findings:        Interpretation per the Radiologist below, if available at the time of this note:    XR LUMBAR SPINE (2-3 VIEWS)   Final Result   Normal radiographic examination of the lumbar spine. PROCEDURES   Unless otherwise noted below, none     Procedures    CRITICAL CARE TIME   N/A    CONSULTS:  None      EMERGENCY DEPARTMENT COURSE and DIFFERENTIAL DIAGNOSIS/MDM:   Vitals:    Vitals:    06/13/21 1402   BP: (!) 145/90   Pulse: 81   Resp: 16   Temp: 98.4 °F (36.9 °C)   SpO2: 100%   Weight: 164 lb (74.4 kg)       Patient was given the following medications:  Medications   lidocaine 4 % external patch 1 patch (1 patch Transdermal Patch Applied 6/13/21 1420)   cyclobenzaprine (FLEXERIL) tablet 10 mg (10 mg Oral Given 6/13/21 1420)   ibuprofen (ADVIL;MOTRIN) tablet 400 mg (400 mg Oral Given 6/13/21 1420)           This patient presents complaining of low back pain status post lifting mechanism at work yesterday.   X-ray shows no acute osseous abnormality. My suspicion is low for cauda equina, central cord syndrome, acute spinefracture or dislocation, epidural abscess or hematoma, discitis, meningitis, encephalitis, transverse myelitis, pyelonephritis, perinephric abscess, urosepsis, kidney stone, AAA, dissection, shingles, or other concerning pathology. Differentials include but are not limited to strain, sprain, arthritis, bursitis, tendinitis, contusion, spasm, DDD, DJD. Advised to follow-up with [facility] for this work-related injury and may return to ED per discharge instructions. Vital stable here, and patient is stable for discharge. We have addressed concerns and expectations. FINAL IMPRESSION      1.  Acute bilateral low back pain without sciatica          DISPOSITION/PLAN   DISPOSITION Decision To Discharge 06/13/2021 02:53:06 PM      PATIENT REFERRED TO:  *[address]  [phone]      for follow up for this work related injury    [facility] Emergency Department  [facility]  [phone]    If symptoms worsen      DISCHARGE MEDICATIONS:  Discharge Medication List as of 6/13/2021  3:06 PM      START taking these medications    Details   cyclobenzaprine (FLEXERIL) 10 MG tablet Take 1 tablet by mouth 3 times daily as needed for Muscle spasms, Disp-20 tablet, R-0Normal      naproxen (NAPROSYN) 500 MG tablet Take 1 tablet by mouth 2 times daily for 20 doses, Disp-20 tablet, R-0Normal      lidocaine (LIDODERM) 5 % Place 1 patch onto the skin daily 12 hours on, 12 hours off., Disp-30 patch, R-0Normal             DISCONTINUED MEDICATIONS:  Discharge Medication List as of 6/13/2021  3:06 PM                 (Please note that portions of this note were completed with a voice recognition program.  Efforts were made to edit the dictations but occasionally words are mis-transcribed.)    Sachin Driver PA-C (electronically signed) Lina Mcclure PA-C  06/13/21 1527

## 2022-01-10 ENCOUNTER — APPOINTMENT (OUTPATIENT)
Dept: CT IMAGING | Age: 42
End: 2022-01-10
Payer: COMMERCIAL

## 2022-01-10 ENCOUNTER — HOSPITAL ENCOUNTER (EMERGENCY)
Age: 42
Discharge: HOME | End: 2022-01-11
Payer: COMMERCIAL

## 2022-01-10 ENCOUNTER — APPOINTMENT (OUTPATIENT)
Dept: GENERAL RADIOLOGY | Age: 42
End: 2022-01-10
Payer: COMMERCIAL

## 2022-01-10 DIAGNOSIS — Z20.822 PERSON UNDER INVESTIGATION FOR COVID-19: ICD-10-CM

## 2022-01-10 DIAGNOSIS — B34.9 VIRAL SYNDROME: Primary | ICD-10-CM

## 2022-01-10 LAB
A/G RATIO: 1.2 (ref 1.1–2.2)
ALBUMIN SERPL-MCNC: 4.1 G/DL (ref 3.4–5)
ALP BLD-CCNC: 93 U/L (ref 40–129)
ALT SERPL-CCNC: 26 U/L (ref 10–40)
ANION GAP SERPL CALCULATED.3IONS-SCNC: 11 MMOL/L (ref 3–16)
AST SERPL-CCNC: 23 U/L (ref 15–37)
BASOPHILS ABSOLUTE: 0 K/UL (ref 0–0.2)
BASOPHILS RELATIVE PERCENT: 0.5 %
BILIRUB SERPL-MCNC: 0.6 MG/DL (ref 0–1)
BUN BLDV-MCNC: 15 MG/DL (ref 7–20)
CALCIUM SERPL-MCNC: 9.2 MG/DL (ref 8.3–10.6)
CHLORIDE BLD-SCNC: 102 MMOL/L (ref 99–110)
CO2: 23 MMOL/L (ref 21–32)
CREAT SERPL-MCNC: 0.9 MG/DL (ref 0.9–1.3)
EOSINOPHILS ABSOLUTE: 0.2 K/UL (ref 0–0.6)
EOSINOPHILS RELATIVE PERCENT: 2.2 %
GFR AFRICAN AMERICAN: >60
GFR NON-AFRICAN AMERICAN: >60
GLUCOSE BLD-MCNC: 88 MG/DL (ref 70–99)
HCT VFR BLD CALC: 43.4 % (ref 40.5–52.5)
HEMOGLOBIN: 14.9 G/DL (ref 13.5–17.5)
LYMPHOCYTES ABSOLUTE: 2.3 K/UL (ref 1–5.1)
LYMPHOCYTES RELATIVE PERCENT: 33.2 %
MCH RBC QN AUTO: 28.7 PG (ref 26–34)
MCHC RBC AUTO-ENTMCNC: 34.3 G/DL (ref 31–36)
MCV RBC AUTO: 83.6 FL (ref 80–100)
MONOCYTES ABSOLUTE: 0.3 K/UL (ref 0–1.3)
MONOCYTES RELATIVE PERCENT: 4.9 %
NEUTROPHILS ABSOLUTE: 4 K/UL (ref 1.7–7.7)
NEUTROPHILS RELATIVE PERCENT: 59.2 %
PDW BLD-RTO: 13.5 % (ref 12.4–15.4)
PLATELET # BLD: 162 K/UL (ref 135–450)
PMV BLD AUTO: 10.5 FL (ref 5–10.5)
POTASSIUM SERPL-SCNC: 3.9 MMOL/L (ref 3.5–5.1)
PRO-BNP: 15 PG/ML (ref 0–124)
RBC # BLD: 5.19 M/UL (ref 4.2–5.9)
SODIUM BLD-SCNC: 136 MMOL/L (ref 136–145)
TOTAL PROTEIN: 7.5 G/DL (ref 6.4–8.2)
TROPONIN: <0.01 NG/ML
WBC # BLD: 6.8 K/UL (ref 4–11)

## 2022-01-10 PROCEDURE — 93005 ELECTROCARDIOGRAM TRACING: CPT | Performed by: EMERGENCY MEDICINE

## 2022-01-10 PROCEDURE — 70450 CT HEAD/BRAIN W/O DYE: CPT

## 2022-01-10 PROCEDURE — 99282 EMERGENCY DEPT VISIT SF MDM: CPT

## 2022-01-10 PROCEDURE — 83880 ASSAY OF NATRIURETIC PEPTIDE: CPT

## 2022-01-10 PROCEDURE — 71046 X-RAY EXAM CHEST 2 VIEWS: CPT

## 2022-01-10 PROCEDURE — U0003 INFECTIOUS AGENT DETECTION BY NUCLEIC ACID (DNA OR RNA); SEVERE ACUTE RESPIRATORY SYNDROME CORONAVIRUS 2 (SARS-COV-2) (CORONAVIRUS DISEASE [COVID-19]), AMPLIFIED PROBE TECHNIQUE, MAKING USE OF HIGH THROUGHPUT TECHNOLOGIES AS DESCRIBED BY CMS-2020-01-R: HCPCS

## 2022-01-10 PROCEDURE — 85025 COMPLETE CBC W/AUTO DIFF WBC: CPT

## 2022-01-10 PROCEDURE — U0005 INFEC AGEN DETEC AMPLI PROBE: HCPCS

## 2022-01-10 PROCEDURE — 80053 COMPREHEN METABOLIC PANEL: CPT

## 2022-01-10 PROCEDURE — 84484 ASSAY OF TROPONIN QUANT: CPT

## 2022-01-10 ASSESSMENT — ENCOUNTER SYMPTOMS
VOMITING: 0
SHORTNESS OF BREATH: 1
COUGH: 1
ABDOMINAL PAIN: 0
DIARRHEA: 0
CHEST TIGHTNESS: 0
NAUSEA: 0

## 2022-01-11 VITALS
RESPIRATION RATE: 16 BRPM | OXYGEN SATURATION: 99 % | SYSTOLIC BLOOD PRESSURE: 125 MMHG | HEART RATE: 69 BPM | DIASTOLIC BLOOD PRESSURE: 81 MMHG | TEMPERATURE: 98 F

## 2022-01-11 LAB
EKG ATRIAL RATE: 70 BPM
EKG DIAGNOSIS: NORMAL
EKG P AXIS: 55 DEGREES
EKG P-R INTERVAL: 146 MS
EKG Q-T INTERVAL: 352 MS
EKG QRS DURATION: 84 MS
EKG QTC CALCULATION (BAZETT): 380 MS
EKG R AXIS: 30 DEGREES
EKG T AXIS: 24 DEGREES
EKG VENTRICULAR RATE: 70 BPM
SARS-COV-2: DETECTED

## 2022-01-11 PROCEDURE — 93010 ELECTROCARDIOGRAM REPORT: CPT | Performed by: INTERNAL MEDICINE

## 2022-01-11 RX ORDER — DEXTROMETHORPHAN HYDROBROMIDE AND PROMETHAZINE HYDROCHLORIDE 15; 6.25 MG/5ML; MG/5ML
5 SYRUP ORAL 4 TIMES DAILY PRN
Qty: 120 ML | Refills: 0 | Status: SHIPPED | OUTPATIENT
Start: 2022-01-11 | End: 2022-01-18

## 2022-01-11 RX ORDER — IBUPROFEN 600 MG/1
600 TABLET ORAL 4 TIMES DAILY PRN
Qty: 40 TABLET | Refills: 0 | Status: SHIPPED | OUTPATIENT
Start: 2022-01-11 | End: 2022-03-08

## 2022-01-11 NOTE — ED PROVIDER NOTES
[facility]        Pt Name: Geralynn Apley  MRN: [id number]  [place] 1980  Date of evaluation: 1/10/2022  Provider: NATALY Bailon CNP  PCP: No primary care provider on file. Note Started: 10:31 PM EST       Patient was seen independently with attending physician available for consult as necessary    [facility]       Chief Complaint   Patient presents with    Cough     Pt states that he has beenn hhvaing CP from a cough that he has had since Friday. Pt states that it only comes on after coughing       HISTORY OF PRESENT ILLNESS   (Location, Timing/Onset, Context/Setting, Quality, Duration, Modifying Factors, Severity, Associated Signs and Symptoms)  Note limiting factors. Chief Complaint: Headache, left-sided chest pain with cough. Onset of symptoms yesterday. Geralynn Apley is a 36 y.o. male who presents to the emergency department complaining of headache and left-sided chest pain associated with cough. The patient reports onset of symptoms yesterday. States that his chest is only hurting after he coughs. Denies any daily medications or known medical problems. No known sick exposure. Vaccinated for COVID, has not had booster    Denies any fever, lightheadedness, dizziness, visual disturbances. No neck or back pain. No abdominal pain, nausea, vomiting, diarrhea, constipation, or dysuria. No rash. Nursing Notes were all reviewed and agreed with or any disagreements were addressed in the HPI. REVIEW OF SYSTEMS    (2-9 systems for level 4, 10 or more for level 5)     Review of Systems   Constitutional: Negative for activity change, chills and fever. Respiratory: Positive for cough and shortness of breath. Negative for chest tightness. Cardiovascular: Positive for chest pain. Gastrointestinal: Negative for abdominal pain, diarrhea, nausea and vomiting. Genitourinary: Negative for dysuria. Neurological: Positive for headaches. All other systems reviewed and are negative. Positives and Pertinent negatives as per HPI. Except as noted above in the ROS, all other systems were reviewed and negative. PAST MEDICAL HISTORY     Past Medical History:   Diagnosis Date    COVID-19          SURGICAL HISTORY   History reviewed. No pertinent surgical history. [address]       Discharge Medication List as of 1/11/2022 12:12 AM      CONTINUE these medications which have NOT CHANGED    Details   cyclobenzaprine (FLEXERIL) 10 MG tablet Take 1 tablet by mouth 3 times daily as needed for Muscle spasms, Disp-20 tablet, R-0Normal      naproxen (NAPROSYN) 500 MG tablet Take 1 tablet by mouth 2 times daily for 20 doses, Disp-20 tablet, R-0Normal      lidocaine (LIDODERM) 5 % Place 1 patch onto the skin daily 12 hours on, 12 hours off., Disp-30 patch, R-0Normal      butalbital-APAP-caffeine (FIORICET) -40 MG CAPS per capsule Take 1 capsule by mouth every 4 hours as needed for Headaches or Migraine, Disp-21 capsule,R-0Print      aspirin 325 MG EC tablet Take 1 tablet by mouth daily, Disp-30 tablet,R-0Print               ALLERGIES     Patient has no known allergies.     FAMILYHISTORY       Family History   Problem Relation Age of Onset    Diabetes Mother     Depression Mother     High Blood Pressure Mother     High Blood Pressure Father     Blindness Father     Diabetes Father     High Cholesterol Father     Other Father         thyroid issues          SOCIAL HISTORY       Social History     Tobacco Use    Smoking status: Never Smoker    Smokeless tobacco: Never Used   Substance Use Topics    Alcohol use: No    Drug use: No       SCREENINGS             PHYSICAL EXAM    (up to 7 for level 4, 8 or more for level 5)     ED Triage Vitals   BP Temp Temp Source Pulse Resp SpO2 Height Weight   01/10/22 2103 01/10/22 2105 01/10/22 2103 01/10/22 2105 01/10/22 2103 01/10/22 2105 -- --   Raissa Lincoln 140/98 98.6 °F (37 °C) Oral 76 18 96 %         Physical Exam  Vitals and nursing note reviewed. Constitutional:       Appearance: He is well-developed. He is not diaphoretic. HENT:      Head: Normocephalic and atraumatic. Right Ear: External ear normal.      Left Ear: External ear normal.   Eyes:      General:         Right eye: No discharge. Left eye: No discharge. Neck:      Vascular: No JVD. Cardiovascular:      Rate and Rhythm: Normal rate and regular rhythm. Pulses: Normal pulses. Heart sounds: Normal heart sounds. Pulmonary:      Effort: Pulmonary effort is normal. No respiratory distress. Breath sounds: Normal breath sounds. Abdominal:      Palpations: Abdomen is soft. Musculoskeletal:         General: Normal range of motion. Cervical back: Normal range of motion and neck supple. Skin:     General: Skin is warm and dry. Coloration: Skin is not pale. Neurological:      Mental Status: He is alert and oriented to person, place, and time. Cranial Nerves: Cranial nerves are intact. Sensory: Sensation is intact. Motor: Motor function is intact. Coordination: Coordination is intact.    Psychiatric:         Behavior: Behavior normal.         DIAGNOSTIC RESULTS   LABS:    Labs Reviewed   CBC WITH AUTO DIFFERENTIAL    Narrative:     Performed at:  [facility] [address], [place], [address]   Phone [phone]   COMPREHENSIVE METABOLIC PANEL    Narrative:     Performed at:  [facility] [address], [place], [address]   Phone [phone]   TROPONIN    Narrative:     Performed at:  [facility] [address], [place], [facility] [place]   Phone [phone]   BRAIN NATRIURETIC PEPTIDE    Narrative:     Performed at:  [facility] [address], [place], [address]   Phone [phone] pressure that I saw when evaluating the patient was an error. We did recheck the blood pressure multiple times and it was normotensive following. CBC is unremarkable. CMP unremarkable. Troponin negative. BNP 15.    CT HEAD WO CONTRAST (Final result)  Result time 01/10/22 23:25:29  Final result by Joanne Corado MD (01/10/22 23:25:29)                Impression:    No acute intracranial abnormality.                     XR CHEST (2 VW) (Final result)  Result time 01/10/22 22:50:49  Procedure changed from XR CHEST PORTABLE  Final result by Dylon Abreu MD (01/10/22 22:50:49)                Impression:    No acute cardiopulmonary abnormality. Patient will be discharged with Phenergan DM, ibuprofen, and PCP referral.  Return for new or worsening symptoms. COVID swab is pending    Based on clinical presentation, physical exam and diagnostics, the patient likely has a viral illness. I discussed symptomatic treatment, fluids, and rest. Patient is advised to follow-up with their family doctor within 24-48 hours and return to the ER if he does not improve as anticipated over the next several days, develops difficulty breathing, weakness, inability to take liquids, or has other concerns. FINAL IMPRESSION      1. Viral syndrome    2.  Person under investigation for COVID-19          DISPOSITION/PLAN   DISPOSITION Decision To Discharge 01/11/2022 12:09:50 AM      PATIENT REFERRED TO:  [facility]) Pre-Services  [phone]  Schedule an appointment as soon as possible for a visit         DISCHARGE MEDICATIONS:  Discharge Medication List as of 1/11/2022 12:12 AM      START taking these medications    Details   promethazine-dextromethorphan (PROMETHAZINE-DM) 6.25-15 MG/5ML syrup Take 5 mLs by mouth 4 times daily as needed for Cough, Disp-120 mL, R-0Print             DISCONTINUED MEDICATIONS:  Discharge Medication List as of 1/11/2022 12:12 AM                 (Please note that portions of this note were completed with a voice recognition program.  Efforts were made to edit the dictations but occasionally words are mis-transcribed.)    NATALY Vega CNP (electronically signed)           NATALY Vega CNP  01/11/22 8803

## 2022-03-08 ENCOUNTER — HOSPITAL ENCOUNTER (EMERGENCY)
Age: 42
Discharge: HOME | End: 2022-03-08
Payer: COMMERCIAL

## 2022-03-08 ENCOUNTER — APPOINTMENT (OUTPATIENT)
Dept: GENERAL RADIOLOGY | Age: 42
End: 2022-03-08
Payer: COMMERCIAL

## 2022-03-08 VITALS
RESPIRATION RATE: 20 BRPM | BODY MASS INDEX: 25.58 KG/M2 | HEIGHT: 67 IN | TEMPERATURE: 96.9 F | SYSTOLIC BLOOD PRESSURE: 151 MMHG | WEIGHT: 163 LBS | HEART RATE: 85 BPM | OXYGEN SATURATION: 96 % | DIASTOLIC BLOOD PRESSURE: 93 MMHG

## 2022-03-08 DIAGNOSIS — S63.501A RIGHT WRIST SPRAIN, INITIAL ENCOUNTER: Primary | ICD-10-CM

## 2022-03-08 PROCEDURE — 6370000000 HC RX 637 (ALT 250 FOR IP): Performed by: PHYSICIAN ASSISTANT

## 2022-03-08 PROCEDURE — 99283 EMERGENCY DEPT VISIT LOW MDM: CPT

## 2022-03-08 PROCEDURE — 73110 X-RAY EXAM OF WRIST: CPT

## 2022-03-08 RX ORDER — IBUPROFEN 800 MG/1
800 TABLET ORAL ONCE
Status: COMPLETED | OUTPATIENT
Start: 2022-03-08 | End: 2022-03-08

## 2022-03-08 RX ORDER — IBUPROFEN 800 MG/1
800 TABLET ORAL EVERY 8 HOURS PRN
Qty: 40 TABLET | Refills: 0 | Status: SHIPPED | OUTPATIENT
Start: 2022-03-08

## 2022-03-08 RX ADMIN — IBUPROFEN 800 MG: 800 TABLET, FILM COATED ORAL at 14:42

## 2022-03-08 ASSESSMENT — ENCOUNTER SYMPTOMS
CONSTIPATION: 0
VOMITING: 0
COUGH: 0
SHORTNESS OF BREATH: 0
NAUSEA: 0
ABDOMINAL PAIN: 0
DIARRHEA: 0

## 2022-03-08 ASSESSMENT — PAIN DESCRIPTION - LOCATION: LOCATION: HAND

## 2022-03-08 ASSESSMENT — PAIN SCALES - GENERAL
PAINLEVEL_OUTOF10: 6
PAINLEVEL_OUTOF10: 6

## 2022-03-08 ASSESSMENT — PAIN DESCRIPTION - ORIENTATION: ORIENTATION: RIGHT

## 2022-03-08 ASSESSMENT — PAIN - FUNCTIONAL ASSESSMENT: PAIN_FUNCTIONAL_ASSESSMENT: FACES

## 2022-03-08 NOTE — Clinical Note
Raymond Hauser was seen and treated in our emergency department on 3/8/2022. He may return to work on 03/10/2022. If you have any questions or concerns, please don't hesitate to call.       ERIC Lane

## 2022-03-08 NOTE — ED PROVIDER NOTES
**EVALUATED BY SUDHEER**    [address] [facility]  eMERGENCY dEPARTMENT eNCOUnter    Pt Name: Apple Cool  MRN: [id number]  [place] 1980  Date of evaluation: 3/8/2022  Provider: ERIC Landrum    Chief Complaint:    Chief Complaint   Patient presents with    Hand Injury     hand pain that is getting worse       Nursing Notes, Past Medical Hx, Past Surgical Hx, Social Hx, Allergies, and Family Hx were all reviewed and agreedwith or any disagreements were addressed in the HPI.    HPI:  (Location, Duration, Timing, Severity, Quality, Assoc Sx, Context, Modifying factors)  This is a  36 y.o. male [place] speaking  #[id number] who presents to the emergency department with complaints of right lateral wrist pain that has been going since yesterday. Patient denies any known injury. Patient is right hand dominant. Patient states that he does do repetitive movement with his hands was a lot of lifting. Denies any erythema warmth. Denies distal numbness tingling or weakness. Pain does radiate up into the right arm, pain is 6 out of 10 in severity described as sore and sharp. All other systems were reviewed and are negative. Past Medical/Surgical History:      Diagnosis Date    COVID-19      History reviewed. No pertinent surgical history. Medications:  Discharge Medication List as of 3/8/2022  3:30 PM      CONTINUE these medications which have NOT CHANGED    Details   butalbital-APAP-caffeine (FIORICET) -40 MG CAPS per capsule Take 1 capsule by mouth every 4 hours as needed for Headaches or Migraine, Disp-21 capsule,R-0Print           Review of Systems:  Review of Systems   Constitutional: Negative for chills, fatigue and fever. Respiratory: Negative for cough and shortness of breath. Cardiovascular: Negative for chest pain. Gastrointestinal: Negative for abdominal pain, constipation, diarrhea, nausea and vomiting.    Musculoskeletal: Positive for arthralgias and myalgias. Neurological: Negative for dizziness, weakness, numbness and headaches. All other systems reviewed and are negative. Positives and Pertinent negatives as per HPI. Except as noted above in the ROS, all other systems were reviewed/completed and are negative. Physical Exam:  Physical Exam  Vitals and nursing note reviewed. Constitutional:       Appearance: Normal appearance. He is well-developed. He is not toxic-appearing or diaphoretic. HENT:      Head: Normocephalic. Right Ear: External ear normal.      Left Ear: External ear normal.      Nose: Nose normal.   Eyes:      General:         Right eye: No discharge. Left eye: No discharge. Conjunctiva/sclera: Conjunctivae normal.   Cardiovascular:      Rate and Rhythm: Normal rate and regular rhythm. Pulses:           Radial pulses are 2+ on the right side. Heart sounds: Normal heart sounds. No murmur heard. No friction rub. No gallop. Comments: 2+ right radial pulse  Pulmonary:      Effort: Pulmonary effort is normal. No respiratory distress. Breath sounds: Normal breath sounds. No wheezing or rales. Musculoskeletal:         General: Tenderness present. Normal range of motion. Cervical back: Normal range of motion and neck supple. Comments: Right ulnar pain with palpation, normal range of motion with flexion and extension. Negative Finkelstein test right hand   Skin:     General: Skin is warm and dry. Coloration: Skin is not pale. Neurological:      Mental Status: He is alert and oriented to person, place, and time. Psychiatric:         Behavior: Behavior normal. Behavior is cooperative. MEDICAL DECISION MAKING    Vitals:    Vitals:    03/08/22 1406   BP: (!) 151/93   Pulse: 85   Resp: 20   Temp: 96.9 °F (36.1 °C)   TempSrc: Oral   SpO2: 96%   Weight: 163 lb (73.9 kg)   Height: 5' 7\" (1.702 m)       LABS:   No results found for this visit on 03/08/22.     Remainder of labs reviewed and were negative at this time or not returned at the time of this note. RADIOLOGY:   Non-plain film images suchas CT, Ultrasound and MRI are read by the radiologist. IDenia PA have directly visualized the radiologic plain film image(s) with the below findings:  Interpretation per the Radiologist below, if available at the time of this note:    XR WRIST RIGHT (MIN 3 VIEWS)   Final Result   Addendum 1 of 1   ADDENDUM:   No acute fracture or malalignment. No significant degenerative changes. Soft tissues are unremarkable. IMPRESSION:   1. No acute fracture or malalignment. Final          MEDICAL DECISION MAKING / ED COURSE:      PROCEDURES:   Procedures    Patient was given:  Medications   ibuprofen (ADVIL;MOTRIN) tablet 800 mg (800 mg Oral Given 3/8/22 1442)     Patient presents to the emergency department with right wrist pain, given Motrin here in the ER. He is wearing a wrist brace. Negative imaging. Normal distal neurovascular exam.  Motrin for home. No fracture or dislocation. Discharged home    The patient tolerated their visit well. I have evaluated the patient with physician available for consultation as needed. I have discussed the findings of today's workup with the patient and addressed the patient's questions and concerns. Important warning signs as well as new or worsening symptoms which wouldnecessitate immediate return to the ED were discussed. The plan is to discharge from the ED at this time, and the patient is in stable condition. The patient acknowledged understanding is agreeable with this plan. CLINICAL IMPRESSION:  1.  Right wrist sprain, initial encounter        DISPOSITION Decision To Discharge 03/08/2022 03:28:58 PM      PATIENT REFERRED TO:  Aixa Oconnor MD  [address] [place] [address]  [phone]    Schedule an appointment as soon as possible for a visit       [facility] Emergency Department  [address]  [phone]  Go to   If symptoms worsen      DISCHARGE MEDICATIONS:  Discharge Medication List as of 3/8/2022  3:30 PM                 (Please note the MDM and HPI sections of this note were completed with avoice recognition program.  Efforts were made to edit the dictations but occasionally words are mis-transcribed.)    Electronically signed, ERIC Rodríguez,             ERIC Lane  03/08/22 [id number]

## 2022-04-27 ENCOUNTER — HOSPITAL ENCOUNTER (EMERGENCY)
Age: 42
Discharge: HOME | End: 2022-04-27
Payer: COMMERCIAL

## 2022-04-27 VITALS
RESPIRATION RATE: 18 BRPM | SYSTOLIC BLOOD PRESSURE: 150 MMHG | OXYGEN SATURATION: 99 % | HEART RATE: 76 BPM | TEMPERATURE: 98 F | DIASTOLIC BLOOD PRESSURE: 80 MMHG

## 2022-04-27 DIAGNOSIS — M54.50 ACUTE LOW BACK PAIN, UNSPECIFIED BACK PAIN LATERALITY, UNSPECIFIED WHETHER SCIATICA PRESENT: Primary | ICD-10-CM

## 2022-04-27 PROCEDURE — 99283 EMERGENCY DEPT VISIT LOW MDM: CPT

## 2022-04-27 RX ORDER — NAPROXEN 500 MG/1
500 TABLET ORAL 2 TIMES DAILY
Qty: 20 TABLET | Refills: 0 | Status: SHIPPED | OUTPATIENT
Start: 2022-04-27 | End: 2022-05-07

## 2022-04-27 RX ORDER — CYCLOBENZAPRINE HCL 10 MG
10 TABLET ORAL 3 TIMES DAILY PRN
Qty: 20 TABLET | Refills: 0 | Status: SHIPPED | OUTPATIENT
Start: 2022-04-27 | End: 2022-05-07

## 2022-04-27 RX ORDER — LIDOCAINE 50 MG/G
1 PATCH TOPICAL DAILY
Qty: 30 PATCH | Refills: 0 | Status: SHIPPED | OUTPATIENT
Start: 2022-04-27

## 2022-04-27 NOTE — ED PROVIDER NOTES
[address] [facility]        Pt Name: Alysa Brand  MRN: [id number]  [place] 1980  Date of evaluation: 4/27/2022  Provider: Rose Pineda PA-C  PCP: No primary care provider on file. Note Started: 4:34 PM EDT       SUDHEER. I have evaluated this patient. My supervising physician was available for consultation. CHIEF COMPLAINT       Chief Complaint   Patient presents with    Back Pain     Patient in with complaints of back pain for two nights. states that he had no injury. denies urinary symptoms        HISTORY OF PRESENT ILLNESS   (Location, Timing/Onset, Context/Setting, Quality, Duration, Modifying Factors, Severity, Associated Signs and Symptoms)  Note limiting factors. Chief Complaint: Low back pain    Alysa Brand is a 36 y.o. male who presents to the emergency department the chief complaint of some low back pain that began while he was at work 2 days ago. He states he was picking up a container some material when he began having the pain. Denies falling or direct injury or trauma to his back. Pain is worse when he moves. Denies any history of diabetes, kidney failure, recent injection or surgery, malignancy or IV drug use. Denies loss of bowel or bladder function, saddle anesthesia or any urinary or bowel symptoms. Denies vomiting, fevers, abdominal pain, chest pain, shortness of breath. He was driven here by his wife. Has still been able to ambulate. Rates the pain a 7 out of 10. Nursing Notes were all reviewed and agreed with or any disagreements were addressed in the HPI. REVIEW OF SYSTEMS    (2-9 systems for level 4, 10 or more for level 5)     Review of Systems    Positives and Pertinent negatives as per HPI. Except as noted above in the ROS, all other systems were reviewed and negative. PAST MEDICAL HISTORY     Past Medical History:   Diagnosis Date    COVID-19          SURGICAL HISTORY   History reviewed. No pertinent surgical history. CURRENTMEDICATIONS       Previous Medications    BUTALBITAL-APAP-CAFFEINE (FIORICET) -40 MG CAPS PER CAPSULE    Take 1 capsule by mouth every 4 hours as needed for Headaches or Migraine    IBUPROFEN (ADVIL;MOTRIN) 800 MG TABLET    Take 1 tablet by mouth every 8 hours as needed for Pain         ALLERGIES     Patient has no known allergies. FAMILYHISTORY       Family History   Problem Relation Age of Onset    Diabetes Mother     Depression Mother     High Blood Pressure Mother     High Blood Pressure Father     Blindness Father     Diabetes Father     High Cholesterol Father     Other Father         thyroid issues          SOCIAL HISTORY       Social History     Tobacco Use    Smoking status: Never Smoker    Smokeless tobacco: Never Used   Substance Use Topics    Alcohol use: No    Drug use: No       SCREENINGS    [place] Coma Scale  Eye Opening: Spontaneous  Best Verbal Response: Oriented  Best Motor Response: Obeys commands  Ciro Coma Scale Score: 15        PHYSICAL EXAM    (up to 7 for level 4, 8 or more for level 5)     ED Triage Vitals [04/27/22 1619]   BP Temp Temp Source Pulse Resp SpO2 Height Weight   (!) 150/80 98 °F (36.7 °C) Oral 76 18 99 % -- --       Physical Exam  Vitals and nursing note reviewed. Constitutional:       Appearance: He is well-developed. He is not diaphoretic. HENT:      Head: Atraumatic. Nose: Nose normal.   Eyes:      General:         Right eye: No discharge. Left eye: No discharge. Cardiovascular:      Rate and Rhythm: Normal rate and regular rhythm. Heart sounds: No murmur heard. No friction rub. No gallop. Pulmonary:      Effort: Pulmonary effort is normal. No respiratory distress. Breath sounds: No stridor. No wheezing, rhonchi or rales. Abdominal:      General: Bowel sounds are normal. There is no distension. Palpations: Abdomen is soft. There is no mass. Tenderness:  There is no abdominal tenderness. There is no guarding or rebound. Hernia: No hernia is present. Musculoskeletal:         General: Tenderness present. No swelling. Normal range of motion. Cervical back: Normal range of motion. Comments: Generalized tenderness around the paralumbar musculature bilaterally. No point tenderness or step-off deformity in the neck or back. Patient able to ambulate without foot drop. Sensation light touch in bilateral lower extremities intact. 2+ patellar and Achilles tendon reflexes bilaterally. Full range of motion with flexion extension of bilateral hips, knees, ankles. Skin:     General: Skin is warm and dry. Findings: No erythema or rash. Neurological:      Mental Status: He is alert and oriented to person, place, and time. Cranial Nerves: No cranial nerve deficit. Psychiatric:         Behavior: Behavior normal.         DIAGNOSTIC RESULTS   LABS:    Labs Reviewed - No data to display    When ordered only abnormal lab results are displayed. All other labs were within normal range or not returned as of this dictation. EKG: When ordered, EKG's are interpreted by the Emergency Department Physician in the absence of a cardiologist.  Please see their note for interpretation of EKG. RADIOLOGY:   Non-plain film images such as CT, Ultrasound and MRI are read by the radiologist. Plain radiographic images are visualized and preliminarily interpreted by the ED Provider with the below findings:        Interpretation per the Radiologist below, if available at the time of this note:    No orders to display     No results found.         PROCEDURES   Unless otherwise noted below, none     Procedures    CRITICAL CARE TIME       CONSULTS:  None      EMERGENCY DEPARTMENT COURSE and DIFFERENTIAL DIAGNOSIS/MDM:   Vitals:    Vitals:    04/27/22 1619   BP: (!) 150/80   Pulse: 76   Resp: 18   Temp: 98 °F (36.7 °C)   TempSrc: Oral   SpO2: 99%       Patient was given the following medications:  Medications - No data to display        Patient presented with some low back pain that was nontraumatic. Low suspicion for vertebral fracture, cauda equina, epidural abscess, cord injury, AAA, acute abdomen or other emergent etiology. He states this began while he was just picking something up and denies any actual injury to his back or fall. Do not believe imaging is warranted at this time. He was educated on symptomatic treatment at home. We will follow-up with [facility] as this happened at work. Return here for any worsening of symptoms or problems at home. States this will be Worker's Compensation. FINAL IMPRESSION      1. Acute low back pain, unspecified back pain laterality, unspecified whether sciatica present          DISPOSITION/PLAN   DISPOSITION Decision To Discharge 04/27/2022 04:29:46 PM      PATIENT REFERRED TO:  *[address] [address] Kushal Tierney [facility]  [phone]    Schedule an appointment as soon as possible for a visit in 3 days  For re-check    [facility] Emergency Department  [address] [place] [address]  [phone]    As needed      DISCHARGE MEDICATIONS:  New Prescriptions    CYCLOBENZAPRINE (FLEXERIL) 10 MG TABLET    Take 1 tablet by mouth 3 times daily as needed for Muscle spasms    LIDOCAINE (LIDODERM) 5 %    Place 1 patch onto the skin daily 12 hours on, 12 hours off.     NAPROXEN (NAPROSYN) 500 MG TABLET    Take 1 tablet by mouth 2 times daily for 20 doses       DISCONTINUED MEDICATIONS:  Discontinued Medications    No medications on file              (Please note that portions of this note were completed with a voice recognition program.  Efforts were made to edit the dictations but occasionally words are mis-transcribed.)    Leonard Vee PA-C (electronically signed)           Leonard Vee PA-C  04/27/22 [id number]

## 2022-07-21 ENCOUNTER — HOSPITAL ENCOUNTER (EMERGENCY)
Age: 42
Discharge: HOME | End: 2022-07-21
Payer: COMMERCIAL

## 2022-07-21 VITALS
OXYGEN SATURATION: 97 % | TEMPERATURE: 98.7 F | SYSTOLIC BLOOD PRESSURE: 138 MMHG | WEIGHT: 164.6 LBS | DIASTOLIC BLOOD PRESSURE: 87 MMHG | HEART RATE: 78 BPM | RESPIRATION RATE: 18 BRPM | BODY MASS INDEX: 26.45 KG/M2 | HEIGHT: 66 IN

## 2022-07-21 DIAGNOSIS — H11.012: Primary | ICD-10-CM

## 2022-07-21 PROCEDURE — 99283 EMERGENCY DEPT VISIT LOW MDM: CPT

## 2022-07-21 RX ORDER — MAG HYDROX/ALUMINUM HYD/SIMETH 400-400-40
1 SUSPENSION, ORAL (FINAL DOSE FORM) ORAL PRN
Qty: 5 ML | Refills: 0 | Status: SHIPPED | OUTPATIENT
Start: 2022-07-21

## 2022-07-21 ASSESSMENT — ENCOUNTER SYMPTOMS
EYE ITCHING: 0
ABDOMINAL PAIN: 0
PHOTOPHOBIA: 0
DIARRHEA: 0
EYE DISCHARGE: 0
WHEEZING: 0
EYE PAIN: 1
EYE REDNESS: 1
COUGH: 0
RHINORRHEA: 0
VOMITING: 0
NAUSEA: 0
SHORTNESS OF BREATH: 0

## 2022-07-21 ASSESSMENT — VISUAL ACUITY: OU: 1

## 2022-07-21 ASSESSMENT — PAIN - FUNCTIONAL ASSESSMENT: PAIN_FUNCTIONAL_ASSESSMENT: 0-10

## 2022-07-21 NOTE — ED PROVIDER NOTES
[facility]        Pt Name: Katlin Sterling  MRN: [id number]  [place] 1980  Date of evaluation: 7/21/2022  Provider: Laureano Clark PA-C  PCP: No primary care provider on file. Note Started: 7:57 PM EDT       SUDHEER. I have evaluated this patient. My supervising physician was available for consultation. CHIEF COMPLAINT       Chief Complaint   Patient presents with    Eye Pain     Left eye pain x2 days and neck pain        HISTORY OF PRESENT ILLNESS   (Location, Timing/Onset, Context/Setting, Quality, Duration, Modifying Factors, Severity, Associated Signs and Symptoms)  Note limiting factors. Chief Complaint: L eye sensitivity      Katlin Sterling is a 36 y.o. male who presents for evaluation of left eye sensitivity over the past 2 or 3 days. States that a coworker also told him that there is a colored spot on his eye. He denies any injury or trauma. No foreign body or foreign body sensation. No visual changes. He does not wear corrective lenses. No recent illness with cough congestion runny nose. No fevers or chills. No other complaints or concerns at this time. Above information obtained using [facility] . Nursing Notes were all reviewed and agreed with or any disagreements were addressed in the HPI. REVIEW OF SYSTEMS    (2-9 systems for level 4, 10 or more for level 5)     Review of Systems   Constitutional:  Negative for appetite change, chills and fever. HENT:  Negative for congestion and rhinorrhea. Eyes:  Positive for pain and redness. Negative for photophobia, discharge, itching and visual disturbance. Respiratory:  Negative for cough, shortness of breath and wheezing. Cardiovascular:  Negative for chest pain. Gastrointestinal:  Negative for abdominal pain, diarrhea, nausea and vomiting. Genitourinary:  Negative for difficulty urinating, dysuria and hematuria.    Musculoskeletal:  Negative DISCONTINUED MEDICATIONS:  Discontinued Medications    No medications on file              (Please note that portions of this note were completed with a voice recognition program.  Efforts were made to edit the dictations but occasionally words are mis-transcribed.)    Kj Aguayo PA-C (electronically signed)           Jag Westfall PA-C  07/21/22 [address] [place] [address]  07/21/22 2003

## 2022-12-22 ENCOUNTER — HOSPITAL ENCOUNTER (EMERGENCY)
Age: 42
Discharge: HOME | End: 2022-12-22
Payer: COMMERCIAL

## 2022-12-22 ENCOUNTER — APPOINTMENT (OUTPATIENT)
Dept: CT IMAGING | Age: 42
End: 2022-12-22
Payer: COMMERCIAL

## 2022-12-22 VITALS
BODY MASS INDEX: 24.11 KG/M2 | SYSTOLIC BLOOD PRESSURE: 114 MMHG | DIASTOLIC BLOOD PRESSURE: 69 MMHG | TEMPERATURE: 100.1 F | HEIGHT: 66 IN | WEIGHT: 150 LBS | HEART RATE: 98 BPM | OXYGEN SATURATION: 95 % | RESPIRATION RATE: 16 BRPM

## 2022-12-22 DIAGNOSIS — R10.33 PERIUMBILICAL ABDOMINAL PAIN: Primary | ICD-10-CM

## 2022-12-22 LAB
A/G RATIO: 1.2 (ref 1.1–2.2)
ALBUMIN SERPL-MCNC: 3.7 G/DL (ref 3.4–5)
ALP BLD-CCNC: 105 U/L (ref 40–129)
ALT SERPL-CCNC: 65 U/L (ref 10–40)
ANION GAP SERPL CALCULATED.3IONS-SCNC: 8 MMOL/L (ref 3–16)
AST SERPL-CCNC: 46 U/L (ref 15–37)
BASOPHILS ABSOLUTE: 0 K/UL (ref 0–0.2)
BASOPHILS RELATIVE PERCENT: 0.4 %
BILIRUB SERPL-MCNC: 1.3 MG/DL (ref 0–1)
BILIRUBIN URINE: NEGATIVE
BLOOD, URINE: NEGATIVE
BUN BLDV-MCNC: 14 MG/DL (ref 7–20)
CALCIUM SERPL-MCNC: 8.3 MG/DL (ref 8.3–10.6)
CHLORIDE BLD-SCNC: 103 MMOL/L (ref 99–110)
CLARITY: CLEAR
CO2: 26 MMOL/L (ref 21–32)
COLOR: YELLOW
CREAT SERPL-MCNC: 0.8 MG/DL (ref 0.9–1.3)
EOSINOPHILS ABSOLUTE: 0.1 K/UL (ref 0–0.6)
EOSINOPHILS RELATIVE PERCENT: 1.2 %
GFR SERPL CREATININE-BSD FRML MDRD: >60 ML/MIN/{1.73_M2}
GLUCOSE BLD-MCNC: 111 MG/DL (ref 70–99)
GLUCOSE URINE: NEGATIVE MG/DL
HCT VFR BLD CALC: 43.5 % (ref 40.5–52.5)
HEMOGLOBIN: 14.6 G/DL (ref 13.5–17.5)
KETONES, URINE: NEGATIVE MG/DL
LEUKOCYTE ESTERASE, URINE: NEGATIVE
LIPASE: 47 U/L (ref 13–60)
LYMPHOCYTES ABSOLUTE: 1.1 K/UL (ref 1–5.1)
LYMPHOCYTES RELATIVE PERCENT: 13.3 %
MCH RBC QN AUTO: 27.5 PG (ref 26–34)
MCHC RBC AUTO-ENTMCNC: 33.5 G/DL (ref 31–36)
MCV RBC AUTO: 82.1 FL (ref 80–100)
MICROSCOPIC EXAMINATION: NORMAL
MONOCYTES ABSOLUTE: 0.5 K/UL (ref 0–1.3)
MONOCYTES RELATIVE PERCENT: 5.7 %
NEUTROPHILS ABSOLUTE: 6.6 K/UL (ref 1.7–7.7)
NEUTROPHILS RELATIVE PERCENT: 79.4 %
NITRITE, URINE: NEGATIVE
PDW BLD-RTO: 13.7 % (ref 12.4–15.4)
PH UA: 5.5 (ref 5–8)
PLATELET # BLD: 126 K/UL (ref 135–450)
PMV BLD AUTO: 10.2 FL (ref 5–10.5)
POTASSIUM REFLEX MAGNESIUM: 3.9 MMOL/L (ref 3.5–5.1)
PROTEIN UA: NEGATIVE MG/DL
RAPID INFLUENZA  B AGN: NEGATIVE
RAPID INFLUENZA A AGN: NEGATIVE
RBC # BLD: 5.3 M/UL (ref 4.2–5.9)
SODIUM BLD-SCNC: 137 MMOL/L (ref 136–145)
SPECIFIC GRAVITY UA: 1.02 (ref 1–1.03)
TOTAL PROTEIN: 6.9 G/DL (ref 6.4–8.2)
URINE REFLEX TO CULTURE: NORMAL
URINE TYPE: NORMAL
UROBILINOGEN, URINE: 1 E.U./DL
WBC # BLD: 8.4 K/UL (ref 4–11)

## 2022-12-22 PROCEDURE — 6360000004 HC RX CONTRAST MEDICATION: Performed by: PHYSICIAN ASSISTANT

## 2022-12-22 PROCEDURE — 74177 CT ABD & PELVIS W/CONTRAST: CPT

## 2022-12-22 PROCEDURE — 6370000000 HC RX 637 (ALT 250 FOR IP): Performed by: PHYSICIAN ASSISTANT

## 2022-12-22 PROCEDURE — 81003 URINALYSIS AUTO W/O SCOPE: CPT

## 2022-12-22 PROCEDURE — 85025 COMPLETE CBC W/AUTO DIFF WBC: CPT

## 2022-12-22 PROCEDURE — 99285 EMERGENCY DEPT VISIT HI MDM: CPT

## 2022-12-22 PROCEDURE — 87804 INFLUENZA ASSAY W/OPTIC: CPT

## 2022-12-22 PROCEDURE — 83690 ASSAY OF LIPASE: CPT

## 2022-12-22 PROCEDURE — 80053 COMPREHEN METABOLIC PANEL: CPT

## 2022-12-22 RX ORDER — DICYCLOMINE HYDROCHLORIDE 10 MG/1
10 CAPSULE ORAL EVERY 6 HOURS PRN
Qty: 20 CAPSULE | Refills: 0 | Status: SHIPPED | OUTPATIENT
Start: 2022-12-22 | End: 2022-12-27

## 2022-12-22 RX ORDER — OMEPRAZOLE 20 MG/1
40 CAPSULE, DELAYED RELEASE ORAL DAILY
Qty: 60 CAPSULE | Refills: 0 | Status: SHIPPED | OUTPATIENT
Start: 2022-12-22 | End: 2023-01-21

## 2022-12-22 RX ORDER — IBUPROFEN 600 MG/1
600 TABLET ORAL 3 TIMES DAILY PRN
Qty: 30 TABLET | Refills: 0 | Status: SHIPPED | OUTPATIENT
Start: 2022-12-22

## 2022-12-22 RX ADMIN — ALUMINUM HYDROXIDE, MAGNESIUM HYDROXIDE, AND SIMETHICONE: 200; 200; 20 SUSPENSION ORAL at 15:33

## 2022-12-22 RX ADMIN — IOPAMIDOL 75 ML: 755 INJECTION, SOLUTION INTRAVENOUS at 16:27

## 2022-12-22 ASSESSMENT — ENCOUNTER SYMPTOMS
BACK PAIN: 0
VOMITING: 0
SHORTNESS OF BREATH: 0
EYE PAIN: 0
RHINORRHEA: 0
DIARRHEA: 0
ABDOMINAL PAIN: 1
COUGH: 0
CONSTIPATION: 0
SORE THROAT: 0
NAUSEA: 0

## 2022-12-22 ASSESSMENT — PAIN DESCRIPTION - ORIENTATION: ORIENTATION: LOWER;MID

## 2022-12-22 ASSESSMENT — LIFESTYLE VARIABLES: HOW OFTEN DO YOU HAVE A DRINK CONTAINING ALCOHOL: NEVER

## 2022-12-22 ASSESSMENT — PAIN SCALES - GENERAL: PAINLEVEL_OUTOF10: 8

## 2022-12-22 ASSESSMENT — PAIN - FUNCTIONAL ASSESSMENT: PAIN_FUNCTIONAL_ASSESSMENT: 0-10

## 2022-12-22 ASSESSMENT — PAIN DESCRIPTION - LOCATION: LOCATION: ABDOMEN

## 2022-12-22 NOTE — ED PROVIDER NOTES
[facility]        Pt Name: Johnny Santoyo  MRN: [id number]  [place] 1980  Date of evaluation: 12/22/2022  Provider: ERIC Javed  PCP: No primary care provider on file. Note Started: 3:54 PM EST 12/22/22          SUDHEER. I have evaluated this patient. My supervising physician was available for consultation. CHIEF COMPLAINT       Chief Complaint   Patient presents with    Abdominal Pain     Midline lower abdominal pain x 2 days, denies n/v/d. HISTORY OF PRESENT ILLNESS   (Location, Timing/Onset, Context/Setting, Quality, Duration, Modifying Factors, Severity, Associated Signs and Symptoms)  Note limiting factors. Chief Complaint: Abdominal pain    Johnny Santoyo is a 36 y.o. male who presents to the emergency room due to abdominal pain in the periumbilical area starting 2 days ago. Patient states nothing seems make it better or worse. Patient denies any abdominal surgeries. Patient has any nausea vomiting diarrhea or constipation associated with this. Patient states that the pain is constant throughout the day and has noted subjective fevers. Patient has any chest pain, shortness of breath or difficulty breathing. He has not taken any medication to help with the symptoms. Nursing Notes were all reviewed and agreed with or any disagreements were addressed in the HPI. REVIEW OF SYSTEMS    (2-9 systems for level 4, 10 or more for level 5)     Review of Systems   Constitutional:  Negative for chills, diaphoresis and fever. HENT:  Negative for congestion, rhinorrhea and sore throat. Eyes:  Negative for pain and visual disturbance. Respiratory:  Negative for cough and shortness of breath. Cardiovascular:  Negative for chest pain and leg swelling. Gastrointestinal:  Positive for abdominal pain. Negative for constipation, diarrhea, nausea and vomiting.    Genitourinary:  Negative for difficulty urinating, dysuria and frequency. Musculoskeletal:  Negative for back pain and neck pain. Skin:  Negative for rash and wound. Neurological:  Negative for dizziness and light-headedness. Positives and Pertinent negatives as per HPI. Except as noted above in the ROS, all other systems were reviewed and negative. PAST MEDICAL HISTORY     Past Medical History:   Diagnosis Date    COVID-19          SURGICAL HISTORY   History reviewed. No pertinent surgical history. [address]       Discharge Medication List as of 12/22/2022  5:21 PM        CONTINUE these medications which have NOT CHANGED    Details   polyethyl glycol-propyl glycol 0.4-0.3 % (LUBRICANT EYE DROPS) 0.4-0.3 % ophthalmic solution Place 1 drop into the left eye as needed for Dry Eyes, Disp-5 mL, R-0Normal      naproxen (NAPROSYN) 500 MG tablet Take 1 tablet by mouth 2 times daily for 20 doses, Disp-20 tablet, R-0Normal      lidocaine (LIDODERM) 5 % Place 1 patch onto the skin daily 12 hours on, 12 hours off., Disp-30 patch, R-0Normal      butalbital-APAP-caffeine (FIORICET) -40 MG CAPS per capsule Take 1 capsule by mouth every 4 hours as needed for Headaches or Migraine, Disp-21 capsule,R-0Print               ALLERGIES     Patient has no known allergies.     FAMILYHISTORY       Family History   Problem Relation Age of Onset    Diabetes Mother     Depression Mother     High Blood Pressure Mother     High Blood Pressure Father     Blindness Father     Diabetes Father     High Cholesterol Father     Other Father         thyroid issues          SOCIAL HISTORY       Social History     Tobacco Use    Smoking status: Never    Smokeless tobacco: Never   Substance Use Topics    Alcohol use: No    Drug use: No       SCREENINGS        [place] Coma Scale  Eye Opening: Spontaneous  Best Verbal Response: Oriented  Best Motor Response: Obeys commands  Ciro Coma Scale Score: 15                CIWA Assessment  BP: 114/69  Heart Rate: 98             PHYSICAL EXAM    (up to 7 for level 4, 8 or more for level 5)     ED Triage Vitals [12/22/22 1515]   BP Temp Temp Source Heart Rate Resp SpO2 Height Weight   114/69 100.1 °F (37.8 °C) Oral 98 16 95 % 5' 6\" (1.676 m) 150 lb (68 kg)       Physical Exam  Vitals reviewed. Constitutional:       Appearance: He is normal weight. HENT:      Head: Normocephalic. Cardiovascular:      Heart sounds: Normal heart sounds. Pulmonary:      Effort: Pulmonary effort is normal.      Breath sounds: Normal breath sounds. Abdominal:      General: Abdomen is flat. Bowel sounds are normal.      Palpations: Abdomen is soft. Tenderness: There is abdominal tenderness in the periumbilical area and suprapubic area. There is no right CVA tenderness or left CVA tenderness. Hernia: No hernia is present. Skin:     Findings: No rash. Neurological:      Mental Status: He is alert and oriented to person, place, and time. Psychiatric:         Mood and Affect: Mood normal.         Behavior: Behavior normal.       DIAGNOSTIC RESULTS   LABS:    Labs Reviewed   CBC WITH AUTO DIFFERENTIAL - Abnormal; Notable for the following components:       Result Value    Platelets 403 (*)     All other components within normal limits   COMPREHENSIVE METABOLIC PANEL W/ REFLEX TO MG FOR LOW K - Abnormal; Notable for the following components:    Glucose 111 (*)     Creatinine 0.8 (*)     Total Bilirubin 1.3 (*)     ALT 65 (*)     AST 46 (*)     All other components within normal limits   RAPID INFLUENZA A/B ANTIGENS   LIPASE   URINALYSIS WITH REFLEX TO CULTURE       When ordered only abnormal lab results are displayed. All other labs were within normal range or not returned as of this dictation. EKG: When ordered, EKG's are interpreted by the Emergency Department Physician in the absence of a cardiologist.  Please see their note for interpretation of EKG.     RADIOLOGY:   Non-plain film images such as CT, Ultrasound and MRI are read by the radiologist. Plain radiographic images are visualized and preliminarily interpreted by the ED Provider with the below findings:        Interpretation per the Radiologist below, if available at the time of this note:    CT ABDOMEN PELVIS W IV CONTRAST Additional Contrast? None   Final Result   1. Normal appendix. 2. Questionable mild circumferential wall thickening versus under distension   of the distal sigmoid colon and rectum with colitis not excluded. No   obstruction or perforation. No results found. No results found. PROCEDURES   Unless otherwise noted below, none     Procedures    CRITICAL CARE TIME       CONSULTS:  None      EMERGENCY DEPARTMENT COURSE and DIFFERENTIAL DIAGNOSIS/MDM:   Vitals:    Vitals:    12/22/22 1515   BP: 114/69   Pulse: 98   Resp: 16   Temp: 100.1 °F (37.8 °C)   TempSrc: Oral   SpO2: 95%   Weight: 150 lb (68 kg)   Height: 5' 6\" (1.676 m)       Patient was given the following medications:  Medications   aluminum & magnesium hydroxide-simethicone (MAALOX) 30 mL, lidocaine viscous hcl (XYLOCAINE) 5 mL (GI COCKTAIL) ( Oral Given 12/22/22 1533)   iopamidol (ISOVUE-370) 76 % injection 75 mL (75 mLs IntraVENous Given 12/22/22 1627)         Is this patient to be included in the SEP-1 Core Measure due to severe sepsis or septic shock? No   Exclusion criteria - the patient is NOT to be included for SEP-1 Core Measure due to: Infection is not suspected    44-year-old male presents emergency department due to periumbilical abdominal pain for the past 2 days denies any nausea vomiting or diarrhea. On exam patient does have some mild periumbilical abdominal pain and suprapubic abdominal pain. Concern for possible appendicitis as early appendicitis can present with periumbilical abdominal pain. CBC CMP ordered and were unremarkable. Urinalysis was unremarkable as well.   Influenza testing was negative lipase was normal.  CT scan of the abdomen pelvis showed mild wall thickening of the bowels without any obstruction or other acute abnormalities. The appendix was read as normal.  I discussed these findings with the patient and to have the patient follow-up with gastroenterology for further evaluation of this pain continues. I also prescribed medication to help with his symptoms. At time of discharge patient did have some improvement after he was given a GI cocktail. At this time a low suspicion for kidney stone, pyelonephritis, UTI, appendicitis, bowel obstruction, diverticulitis, hernia, gastritis/gastroenteritis, pancreatitis, cholecystitis, hepatitis, constipation, IBS, IBD, mesenteric ischemia, AAA. FINAL IMPRESSION      1.  Periumbilical abdominal pain          DISPOSITION/PLAN   DISPOSITION Decision To Discharge 12/22/2022 05:05:50 PM      PATIENT REFERRED TO:  [facility] Emergency Department  [address]  [phone]    As needed, If symptoms worsen    ARMIN AYALA  Gastroenterology  [address] [address]  [phone]  Schedule an appointment as soon as possible for a visit in 1 week  recheck    DISCHARGE MEDICATIONS:  Discharge Medication List as of 12/22/2022  5:21 PM        START taking these medications    Details   dicyclomine (BENTYL) 10 MG capsule Take 1 capsule by mouth every 6 hours as needed (Bowel spasms), Disp-20 capsule, R-0Normal      omeprazole (PRILOSEC) 20 MG delayed release capsule Take 2 capsules by mouth Daily, Disp-60 capsule, R-0Normal             DISCONTINUED MEDICATIONS:  Discharge Medication List as of 12/22/2022  5:21 PM        STOP taking these medications       aspirin 325 MG EC tablet Comments:   Reason for Stopping:                      (Please note that portions of this note were completed with a voice recognition program.  Efforts were made to edit the dictations but occasionally words are mis-transcribed.)    Guinevere Libman, PA (electronically signed)            Guinevere Libman, PA  12/22/22 2011

## 2022-12-22 NOTE — DISCHARGE INSTRUCTIONS
Take medication as prescribed    Follow-up with a primary care doctor next week for recheck    Schedule an appointment with gastroenterology for further evaluation if abdominal pain continues    Return to emergency room develop fevers, chills, nausea, vomiting, worsening abdominal pain or inability to have bowel movements.

## 2022-12-22 NOTE — LETTER
[facility] Emergency Department  [address], [employer], [address]             December 22, 2022    Patient: Jazmine Owens   YOB: 1980   Date of Visit: 12/22/2022       To Whom It May Concern: Jazmine Owens was seen and treated in our emergency department on 12/22/2022. He {Return to school/sport/work:[id number]}.       Sincerely,         ***